# Patient Record
Sex: FEMALE | Race: WHITE | Employment: FULL TIME | URBAN - METROPOLITAN AREA
[De-identification: names, ages, dates, MRNs, and addresses within clinical notes are randomized per-mention and may not be internally consistent; named-entity substitution may affect disease eponyms.]

---

## 2017-01-16 DIAGNOSIS — F98.8 ADD (ATTENTION DEFICIT DISORDER): ICD-10-CM

## 2017-01-16 RX ORDER — DEXTROAMPHETAMINE SACCHARATE, AMPHETAMINE ASPARTATE, DEXTROAMPHETAMINE SULFATE AND AMPHETAMINE SULFATE 5; 5; 5; 5 MG/1; MG/1; MG/1; MG/1
TABLET ORAL
Qty: 60 TAB | Refills: 0 | Status: SHIPPED | OUTPATIENT
Start: 2017-01-16 | End: 2017-03-30 | Stop reason: SDUPTHER

## 2017-01-16 NOTE — TELEPHONE ENCOUNTER
Requested Prescriptions     Pending Prescriptions Disp Refills    dextroamphetamine-amphetamine (ADDERALL) 20 mg tablet 60 Tab 0     Sig: Earliest Fill Date: 1/16/17.  1 tablet each AM.    1 tablet each afternoon      Wants 1 month, insurance won't do 3 month, to  tomorrow.

## 2017-03-09 ENCOUNTER — TELEPHONE (OUTPATIENT)
Dept: INTERNAL MEDICINE CLINIC | Age: 37
End: 2017-03-09

## 2017-03-09 RX ORDER — PREDNISONE 20 MG/1
TABLET ORAL
Qty: 20 TAB | Refills: 0 | Status: SHIPPED | OUTPATIENT
Start: 2017-03-09 | End: 2017-05-24 | Stop reason: ALTCHOICE

## 2017-03-09 NOTE — TELEPHONE ENCOUNTER
Patient has posion ivy and wants to know if Dr Beatriz Kitchen could call something to the pharmacy.  #[phone]

## 2017-03-30 DIAGNOSIS — F98.8 ADD (ATTENTION DEFICIT DISORDER): ICD-10-CM

## 2017-03-30 NOTE — TELEPHONE ENCOUNTER
Requested Prescriptions     Pending Prescriptions Disp Refills    dextroamphetamine-amphetamine (ADDERALL) 20 mg tablet 60 Tab 0     Si tablet each AM.    1 tablet each afternoon        [phone]    Patient would like to pick-up tomorrow about one if possible.

## 2017-03-31 RX ORDER — DEXTROAMPHETAMINE SACCHARATE, AMPHETAMINE ASPARTATE, DEXTROAMPHETAMINE SULFATE AND AMPHETAMINE SULFATE 5; 5; 5; 5 MG/1; MG/1; MG/1; MG/1
TABLET ORAL
Qty: 60 TAB | Refills: 0 | Status: SHIPPED | OUTPATIENT
Start: 2017-03-31 | End: 2017-05-16 | Stop reason: SDUPTHER

## 2017-05-16 DIAGNOSIS — F98.8 ADD (ATTENTION DEFICIT DISORDER): ICD-10-CM

## 2017-05-16 RX ORDER — DEXTROAMPHETAMINE SACCHARATE, AMPHETAMINE ASPARTATE, DEXTROAMPHETAMINE SULFATE AND AMPHETAMINE SULFATE 5; 5; 5; 5 MG/1; MG/1; MG/1; MG/1
TABLET ORAL
Qty: 60 TAB | Refills: 0 | Status: SHIPPED | OUTPATIENT
Start: 2017-05-16 | End: 2017-05-24 | Stop reason: ALTCHOICE

## 2017-05-16 NOTE — TELEPHONE ENCOUNTER
Requested Prescriptions     Pending Prescriptions Disp Refills    dextroamphetamine-amphetamine (ADDERALL) 20 mg tablet 60 Tab 0     Si tablet each AM.    1 tablet each afternoon        [phone]

## 2017-05-24 ENCOUNTER — HOSPITAL ENCOUNTER (OUTPATIENT)
Dept: LAB | Age: 37
Discharge: HOME | End: 2017-05-24
Payer: COMMERCIAL

## 2017-05-24 ENCOUNTER — OFFICE VISIT (OUTPATIENT)
Dept: INTERNAL MEDICINE CLINIC | Age: 37
End: 2017-05-24

## 2017-05-24 VITALS
OXYGEN SATURATION: 98 % | WEIGHT: 132 LBS | TEMPERATURE: 97.8 F | DIASTOLIC BLOOD PRESSURE: 66 MMHG | HEART RATE: 67 BPM | RESPIRATION RATE: 16 BRPM | SYSTOLIC BLOOD PRESSURE: 94 MMHG | BODY MASS INDEX: 21.99 KG/M2 | HEIGHT: 65 IN

## 2017-05-24 DIAGNOSIS — Z00.00 WELL ADULT EXAM: ICD-10-CM

## 2017-05-24 DIAGNOSIS — Z00.00 WELL ADULT EXAM: Primary | ICD-10-CM

## 2017-05-24 DIAGNOSIS — F98.8 ADD (ATTENTION DEFICIT DISORDER) WITHOUT HYPERACTIVITY: ICD-10-CM

## 2017-05-24 LAB
ALBUMIN SERPL BCP-MCNC: 4.2 G/DL (ref 3.4–5)
ALBUMIN/GLOB SERPL: 1.6 {RATIO} (ref 0.8–1.7)
ALP SERPL-CCNC: 50 U/L (ref 45–117)
ALT SERPL-CCNC: 18 U/L (ref 13–56)
ANION GAP BLD CALC-SCNC: 7 MMOL/L (ref 3–18)
AST SERPL W P-5'-P-CCNC: 18 U/L (ref 15–37)
BASOPHILS # BLD AUTO: 0 K/UL (ref 0–0.06)
BASOPHILS # BLD: 0 % (ref 0–2)
BILIRUB SERPL-MCNC: 1.3 MG/DL (ref 0.2–1)
BUN SERPL-MCNC: 12 MG/DL (ref 7–18)
BUN/CREAT SERPL: 17 (ref 12–20)
CALCIUM SERPL-MCNC: 9.4 MG/DL (ref 8.5–10.1)
CHLORIDE SERPL-SCNC: 104 MMOL/L (ref 100–108)
CHOLEST SERPL-MCNC: 164 MG/DL
CO2 SERPL-SCNC: 28 MMOL/L (ref 21–32)
CREAT SERPL-MCNC: 0.69 MG/DL (ref 0.6–1.3)
DIFFERENTIAL METHOD BLD: ABNORMAL
EOSINOPHIL # BLD: 0.1 K/UL (ref 0–0.4)
EOSINOPHIL NFR BLD: 2 % (ref 0–5)
ERYTHROCYTE [DISTWIDTH] IN BLOOD BY AUTOMATED COUNT: 11.9 % (ref 11.6–14.5)
GLOBULIN SER CALC-MCNC: 2.7 G/DL (ref 2–4)
GLUCOSE SERPL-MCNC: 87 MG/DL (ref 74–99)
HCT VFR BLD AUTO: 39.8 % (ref 35–45)
HGB BLD-MCNC: 13.4 G/DL (ref 12–16)
LYMPHOCYTES # BLD AUTO: 27 % (ref 21–52)
LYMPHOCYTES # BLD: 1.7 K/UL (ref 0.9–3.6)
MCH RBC QN AUTO: 34 PG (ref 24–34)
MCHC RBC AUTO-ENTMCNC: 33.7 G/DL (ref 31–37)
MCV RBC AUTO: 101 FL (ref 74–97)
MONOCYTES # BLD: 0.4 K/UL (ref 0.05–1.2)
MONOCYTES NFR BLD AUTO: 6 % (ref 3–10)
NEUTS SEG # BLD: 4.1 K/UL (ref 1.8–8)
NEUTS SEG NFR BLD AUTO: 65 % (ref 40–73)
PLATELET # BLD AUTO: 275 K/UL (ref 135–420)
PMV BLD AUTO: 10.5 FL (ref 9.2–11.8)
POTASSIUM SERPL-SCNC: 4.4 MMOL/L (ref 3.5–5.5)
PROT SERPL-MCNC: 6.9 G/DL (ref 6.4–8.2)
RBC # BLD AUTO: 3.94 M/UL (ref 4.2–5.3)
SODIUM SERPL-SCNC: 139 MMOL/L (ref 136–145)
WBC # BLD AUTO: 6.4 K/UL (ref 4.6–13.2)

## 2017-05-24 PROCEDURE — 82465 ASSAY BLD/SERUM CHOLESTEROL: CPT | Performed by: INTERNAL MEDICINE

## 2017-05-24 PROCEDURE — 85025 COMPLETE CBC W/AUTO DIFF WBC: CPT | Performed by: INTERNAL MEDICINE

## 2017-05-24 PROCEDURE — 36415 COLL VENOUS BLD VENIPUNCTURE: CPT | Performed by: INTERNAL MEDICINE

## 2017-05-24 PROCEDURE — 80053 COMPREHEN METABOLIC PANEL: CPT | Performed by: INTERNAL MEDICINE

## 2017-05-24 RX ORDER — ACYCLOVIR 400 MG/1
400 TABLET ORAL
COMMUNITY
End: 2017-10-17 | Stop reason: SDUPTHER

## 2017-05-24 RX ORDER — DEXTROAMPHETAMINE SACCHARATE, AMPHETAMINE ASPARTATE MONOHYDRATE, DEXTROAMPHETAMINE SULFATE AND AMPHETAMINE SULFATE 5; 5; 5; 5 MG/1; MG/1; MG/1; MG/1
CAPSULE, EXTENDED RELEASE ORAL
Qty: 60 CAP | Refills: 0 | Status: SHIPPED | OUTPATIENT
Start: 2017-05-24 | End: 2017-07-11 | Stop reason: SDUPTHER

## 2017-05-24 NOTE — PROGRESS NOTES
1. Have you been to the ER, urgent care clinic since your last visit? Hospitalized since your last visit? No    2. Have you seen or consulted any other health care providers outside of the [address] [place] [address] since your last visit? Include any pap smears or colon screening.  No

## 2017-05-24 NOTE — PATIENT INSTRUCTIONS
Health Maintenance Due   Topic Date Due    DTaP/Tdap/Td  (1 - Tdap) 07/15/2003    Cervical Cancer Screening  07/15/2003

## 2017-05-25 NOTE — PROGRESS NOTES
The patient presents to the office today for a check up    HPI    The patient presents to the office today for a check up. The patient remains on medications for ADD. The patient is doing well on medications. The patient has no complaints. The patient is in a new job. She is much less stressed. Review of Systems   Respiratory: Negative for shortness of breath. Cardiovascular: Negative for chest pain and leg swelling. Allergies   Allergen Reactions    Buspar [Buspirone] Anaphylaxis and Other (comments)     Dizziness  Lightheadedness       Current Outpatient Prescriptions   Medication Sig Dispense Refill    acyclovir (ZOVIRAX) 400 mg tablet Take 400 mg by mouth every four (4) hours (while awake).  amphetamine-dextroamphetamine XR (ADDERALL XR) 20 mg XR capsule 1 tablet twice per day 60 Cap 0       Past Medical History:   Diagnosis Date    Acute reaction to stress     ADD (attention deficit disorder) without hyperactivity     Depressive disorder     GERD (gastroesophageal reflux disease)     Influenza     Low back pain     Sciatica     Sinusitis     Spasm of muscle     Strain of knee        Past Surgical History:   Procedure Laterality Date    HX TONSILLECTOMY      HX WISDOM TEETH EXTRACTION         Social History     Social History    Marital status:      Spouse name: N/A    Number of children: N/A    Years of education: N/A     Occupational History    Not on file.      Social History Main Topics    Smoking status: Never Smoker    Smokeless tobacco: Not on file    Alcohol use Yes      Comment: socially    Drug use: Not on file    Sexual activity: Yes     Other Topics Concern    Not on file     Social History Narrative       Patient does not have an advanced directive on file    Visit Vitals    BP 94/66 (BP 1 Location: Right arm, BP Patient Position: Sitting)    Pulse 67    Temp 97.8 °F (36.6 °C) (Tympanic)    Resp 16    Ht 5' 5\" (1.651 m)    Wt 132 lb (59.9 kg)    SpO2 98%    BMI 21.97 kg/m2       Physical Exam   No Cervical Lymphadenopathy  No Supraclavicular Lymphadenopathy  Thyroid is Normal  Lungs are clear to ausculation and percussion  Heart:  S1 S2 are normal, No gallops, No mummers  No Carotid Bruits  Abdomen:  Normal Bowel Sounds. No tenderness. No masses. No Hepatomegaly or Splenomegly  LE:  Strong Pedal Pulses. No Edema      BMI:  [facility] Outpatient Visit on 05/24/2017   Component Date Value Ref Range Status    WBC 05/24/2017 6.4  4.6 - 13.2 K/uL Final    RBC 05/24/2017 3.94* 4.20 - 5.30 M/uL Final    HGB 05/24/2017 13.4  12.0 - 16.0 g/dL Final    HCT 05/24/2017 39.8  35.0 - 45.0 % Final    MCV 05/24/2017 101.0* 74.0 - 97.0 FL Final    MCH 05/24/2017 34.0  24.0 - 34.0 PG Final    MCHC 05/24/2017 33.7  31.0 - 37.0 g/dL Final    RDW 05/24/2017 11.9  11.6 - 14.5 % Final    PLATELET 60/27/3865 956  135 - 420 K/uL Final    MPV 05/24/2017 10.5  9.2 - 11.8 FL Final    NEUTROPHILS 05/24/2017 65  40 - 73 % Final    LYMPHOCYTES 05/24/2017 27  21 - 52 % Final    MONOCYTES 05/24/2017 6  3 - 10 % Final    EOSINOPHILS 05/24/2017 2  0 - 5 % Final    BASOPHILS 05/24/2017 0  0 - 2 % Final    ABS. NEUTROPHILS 05/24/2017 4.1  1.8 - 8.0 K/UL Final    ABS. LYMPHOCYTES 05/24/2017 1.7  0.9 - 3.6 K/UL Final    ABS. MONOCYTES 05/24/2017 0.4  0.05 - 1.2 K/UL Final    ABS. EOSINOPHILS 05/24/2017 0.1  0.0 - 0.4 K/UL Final    ABS.  BASOPHILS 05/24/2017 0.0  0.0 - 0.06 K/UL Final    DF 05/24/2017 AUTOMATED    Final    Cholesterol, total 05/24/2017 164  <200 MG/DL Final    Sodium 05/24/2017 139  136 - 145 mmol/L Final    Potassium 05/24/2017 4.4  3.5 - 5.5 mmol/L Final    Chloride 05/24/2017 104  100 - 108 mmol/L Final    CO2 05/24/2017 28  21 - 32 mmol/L Final    Anion gap 05/24/2017 7  3.0 - 18 mmol/L Final    Glucose 05/24/2017 87  74 - 99 mg/dL Final    BUN 05/24/2017 12  7.0 - 18 MG/DL Final    Creatinine 05/24/2017 0.69  0.6 - 1.3 MG/DL Final  BUN/Creatinine ratio 05/24/2017 17  12 - 20   Final    GFR est AA 05/24/2017 >60  >60 ml/min/1.73m2 Final    GFR est non-AA 05/24/2017 >60  >60 ml/min/1.73m2 Final    Comment: (NOTE)  Estimated GFR is calculated using the Modification of Diet in Renal   Disease (MDRD) Study equation, reported for both  Americans   (GFRAA) and non- Americans (GFRNA), and normalized to 1.73m2   body surface area. The physician must decide which value applies to   the patient. The MDRD study equation should only be used in   individuals age 25 or older. It has not been validated for the   following: pregnant women, patients with serious comorbid conditions,   or on certain medications, or persons with extremes of body size,   muscle mass, or nutritional status.  Calcium 05/24/2017 9.4  8.5 - 10.1 MG/DL Final    Bilirubin, total 05/24/2017 1.3* 0.2 - 1.0 MG/DL Final    ALT (SGPT) 05/24/2017 18  13 - 56 U/L Final    AST (SGOT) 05/24/2017 18  15 - 37 U/L Final    Alk. phosphatase 05/24/2017 50  45 - 117 U/L Final    Protein, total 05/24/2017 6.9  6.4 - 8.2 g/dL Final    Albumin 05/24/2017 4.2  3.4 - 5.0 g/dL Final    Globulin 05/24/2017 2.7  2.0 - 4.0 g/dL Final    A-G Ratio 05/24/2017 1.6  0.8 - 1.7   Final       .  Results for orders placed or performed during the hospital encounter of 05/24/17   CBC WITH AUTOMATED DIFF   Result Value Ref Range    WBC 6.4 4.6 - 13.2 K/uL    RBC 3.94 (L) 4.20 - 5.30 M/uL    HGB 13.4 12.0 - 16.0 g/dL    HCT 39.8 35.0 - 45.0 %    .0 (H) 74.0 - 97.0 FL    MCH 34.0 24.0 - 34.0 PG    MCHC 33.7 31.0 - 37.0 g/dL    RDW 11.9 11.6 - 14.5 %    PLATELET 503 989 - 786 K/uL    MPV 10.5 9.2 - 11.8 FL    NEUTROPHILS 65 40 - 73 %    LYMPHOCYTES 27 21 - 52 %    MONOCYTES 6 3 - 10 %    EOSINOPHILS 2 0 - 5 %    BASOPHILS 0 0 - 2 %    ABS. NEUTROPHILS 4.1 1.8 - 8.0 K/UL    ABS. LYMPHOCYTES 1.7 0.9 - 3.6 K/UL    ABS. MONOCYTES 0.4 0.05 - 1.2 K/UL    ABS.  EOSINOPHILS 0.1 0.0 - 0.4 K/UL ABS. BASOPHILS 0.0 0.0 - 0.06 K/UL    DF AUTOMATED     CHOLESTEROL, TOTAL   Result Value Ref Range    Cholesterol, total 164 <872 MG/DL   METABOLIC PANEL, COMPREHENSIVE   Result Value Ref Range    Sodium 139 136 - 145 mmol/L    Potassium 4.4 3.5 - 5.5 mmol/L    Chloride 104 100 - 108 mmol/L    CO2 28 21 - 32 mmol/L    Anion gap 7 3.0 - 18 mmol/L    Glucose 87 74 - 99 mg/dL    BUN 12 7.0 - 18 MG/DL    Creatinine 0.69 0.6 - 1.3 MG/DL    BUN/Creatinine ratio 17 12 - 20      GFR est AA >60 >60 ml/min/1.73m2    GFR est non-AA >60 >60 ml/min/1.73m2    Calcium 9.4 8.5 - 10.1 MG/DL    Bilirubin, total 1.3 (H) 0.2 - 1.0 MG/DL    ALT (SGPT) 18 13 - 56 U/L    AST (SGOT) 18 15 - 37 U/L    Alk. phosphatase 50 45 - 117 U/L    Protein, total 6.9 6.4 - 8.2 g/dL    Albumin 4.2 3.4 - 5.0 g/dL    Globulin 2.7 2.0 - 4.0 g/dL    A-G Ratio 1.6 0.8 - 1.7         Assessment / Plan      ICD-10-CM ICD-9-CM    1. Well adult exam Z00.00 V70.0 acyclovir (ZOVIRAX) 400 mg tablet      CBC WITH AUTOMATED DIFF      CHOLESTEROL, TOTAL      METABOLIC PANEL, COMPREHENSIVE   2. ADD (attention deficit disorder) without hyperactivity F98.8 314.00        I advised the patient to continue good health habits  Labs ordered  she was advised to continue her maintenance medications    Follow-up Disposition:  Return in about 6 months (around 11/24/2017). I asked Zaki Roman if she has any questions and I answered the questions.   Zaki Roman states that she understands the treatment plan and agrees with the treatment plan

## 2017-05-30 ENCOUNTER — TELEPHONE (OUTPATIENT)
Dept: INTERNAL MEDICINE CLINIC | Age: 37
End: 2017-05-30

## 2017-05-30 NOTE — TELEPHONE ENCOUNTER
Prior Auth received for D-amphetamine salt ER, Insurance preferred in brand only Adderall XR. Pharmacy contacted and patient contacted.

## 2017-07-11 NOTE — TELEPHONE ENCOUNTER
Requested Prescriptions     Pending Prescriptions Disp Refills    amphetamine-dextroamphetamine XR (ADDERALL XR) 20 mg XR capsule 60 Cap 0     Si tablet twice per day        [phone]

## 2017-07-14 RX ORDER — DEXTROAMPHETAMINE SACCHARATE, AMPHETAMINE ASPARTATE MONOHYDRATE, DEXTROAMPHETAMINE SULFATE AND AMPHETAMINE SULFATE 5; 5; 5; 5 MG/1; MG/1; MG/1; MG/1
CAPSULE, EXTENDED RELEASE ORAL
Qty: 60 CAP | Refills: 0 | Status: SHIPPED | OUTPATIENT
Start: 2017-07-14 | End: 2017-08-22 | Stop reason: SDUPTHER

## 2017-07-19 ENCOUNTER — TELEPHONE (OUTPATIENT)
Dept: INTERNAL MEDICINE CLINIC | Age: 37
End: 2017-07-19

## 2017-08-22 ENCOUNTER — HOSPITAL ENCOUNTER (OUTPATIENT)
Dept: LAB | Age: 37
Discharge: HOME | End: 2017-08-22
Payer: COMMERCIAL

## 2017-08-22 ENCOUNTER — OFFICE VISIT (OUTPATIENT)
Dept: INTERNAL MEDICINE CLINIC | Age: 37
End: 2017-08-22

## 2017-08-22 VITALS
BODY MASS INDEX: 21.99 KG/M2 | HEART RATE: 72 BPM | HEIGHT: 65 IN | OXYGEN SATURATION: 98 % | SYSTOLIC BLOOD PRESSURE: 102 MMHG | WEIGHT: 132 LBS | DIASTOLIC BLOOD PRESSURE: 66 MMHG | RESPIRATION RATE: 16 BRPM | TEMPERATURE: 97.7 F

## 2017-08-22 DIAGNOSIS — R59.1 LYMPHADENOPATHY OF HEAD AND NECK: ICD-10-CM

## 2017-08-22 DIAGNOSIS — R61 NIGHT SWEATS: ICD-10-CM

## 2017-08-22 DIAGNOSIS — M54.50 ACUTE RIGHT-SIDED LOW BACK PAIN WITHOUT SCIATICA: Primary | ICD-10-CM

## 2017-08-22 DIAGNOSIS — M54.50 ACUTE RIGHT-SIDED LOW BACK PAIN WITHOUT SCIATICA: ICD-10-CM

## 2017-08-22 LAB
BASOPHILS # BLD: 0 K/UL (ref 0–0.06)
BASOPHILS NFR BLD: 1 % (ref 0–2)
CRP SERPL-MCNC: <0.3 MG/DL (ref 0–0.3)
DIFFERENTIAL METHOD BLD: ABNORMAL
EOSINOPHIL # BLD: 0.1 K/UL (ref 0–0.4)
EOSINOPHIL NFR BLD: 1 % (ref 0–5)
ERYTHROCYTE [DISTWIDTH] IN BLOOD BY AUTOMATED COUNT: 12 % (ref 11.6–14.5)
ERYTHROCYTE [SEDIMENTATION RATE] IN BLOOD: 4 MM/HR (ref 0–20)
HCT VFR BLD AUTO: 41 % (ref 35–45)
HGB BLD-MCNC: 13.7 G/DL (ref 12–16)
LYMPHOCYTES # BLD: 1.7 K/UL (ref 0.9–3.6)
LYMPHOCYTES NFR BLD: 31 % (ref 21–52)
MCH RBC QN AUTO: 33.7 PG (ref 24–34)
MCHC RBC AUTO-ENTMCNC: 33.4 G/DL (ref 31–37)
MCV RBC AUTO: 101 FL (ref 74–97)
MONOCYTES # BLD: 0.4 K/UL (ref 0.05–1.2)
MONOCYTES NFR BLD: 8 % (ref 3–10)
NEUTS SEG # BLD: 3.2 K/UL (ref 1.8–8)
NEUTS SEG NFR BLD: 59 % (ref 40–73)
PLATELET # BLD AUTO: 278 K/UL (ref 135–420)
PMV BLD AUTO: 10.7 FL (ref 9.2–11.8)
RBC # BLD AUTO: 4.06 M/UL (ref 4.2–5.3)
TSH SERPL DL<=0.05 MIU/L-ACNC: 0.87 UIU/ML (ref 0.36–3.74)
WBC # BLD AUTO: 5.5 K/UL (ref 4.6–13.2)

## 2017-08-22 PROCEDURE — 36415 COLL VENOUS BLD VENIPUNCTURE: CPT | Performed by: INTERNAL MEDICINE

## 2017-08-22 PROCEDURE — 84443 ASSAY THYROID STIM HORMONE: CPT | Performed by: INTERNAL MEDICINE

## 2017-08-22 PROCEDURE — 85025 COMPLETE CBC W/AUTO DIFF WBC: CPT | Performed by: INTERNAL MEDICINE

## 2017-08-22 PROCEDURE — 83921 ORGANIC ACID SINGLE QUANT: CPT | Performed by: INTERNAL MEDICINE

## 2017-08-22 PROCEDURE — 85652 RBC SED RATE AUTOMATED: CPT | Performed by: INTERNAL MEDICINE

## 2017-08-22 PROCEDURE — 86140 C-REACTIVE PROTEIN: CPT | Performed by: INTERNAL MEDICINE

## 2017-08-22 RX ORDER — DEXTROAMPHETAMINE SACCHARATE, AMPHETAMINE ASPARTATE MONOHYDRATE, DEXTROAMPHETAMINE SULFATE AND AMPHETAMINE SULFATE 5; 5; 5; 5 MG/1; MG/1; MG/1; MG/1
CAPSULE, EXTENDED RELEASE ORAL
Qty: 60 CAP | Refills: 0 | Status: SHIPPED | OUTPATIENT
Start: 2017-08-22 | End: 2017-10-03 | Stop reason: SDUPTHER

## 2017-08-22 NOTE — PROGRESS NOTES
The patient presents to the office today with the chief complaint of fatigue    HPI    The patient complains of fatigue, night sweats. She intermittently feels swollen LN in her neck. The patient has right lower back pain nd some pain in her right lower abdomen      Review of Systems   Constitutional: Positive for malaise/fatigue. Respiratory: Negative for shortness of breath. Cardiovascular: Negative for chest pain and leg swelling. Gastrointestinal: Positive for abdominal pain. Musculoskeletal: Positive for back pain. Psychiatric/Behavioral: The patient has insomnia. Allergies   Allergen Reactions    Buspar [Buspirone] Anaphylaxis and Other (comments)     Dizziness  Lightheadedness       Current Outpatient Prescriptions   Medication Sig Dispense Refill    amphetamine-dextroamphetamine XR (ADDERALL XR) 20 mg XR capsule Earliest Fill Date: 7/14/17.  1 tablet twice per day 60 Cap 0    acyclovir (ZOVIRAX) 400 mg tablet Take 400 mg by mouth every four (4) hours (while awake). Past Medical History:   Diagnosis Date    Acute reaction to stress     ADD (attention deficit disorder) without hyperactivity     Depressive disorder     GERD (gastroesophageal reflux disease)     Influenza     Low back pain     Sciatica     Sinusitis     Spasm of muscle     Strain of knee        Past Surgical History:   Procedure Laterality Date    HX TONSILLECTOMY      HX WISDOM TEETH EXTRACTION         Social History     Social History    Marital status:      Spouse name: N/A    Number of children: N/A    Years of education: N/A     Occupational History    Not on file.      Social History Main Topics    Smoking status: Never Smoker    Smokeless tobacco: Never Used    Alcohol use Yes      Comment: socially    Drug use: No    Sexual activity: Yes     Other Topics Concern    Not on file     Social History Narrative       Patient does not have an advanced directive on file    Visit Vitals  /66 (BP 1 Location: Left arm, BP Patient Position: Sitting)    Pulse 72    Temp 97.7 °F (36.5 °C) (Tympanic)    Resp 16    Ht 5' 5\" (1.651 m)    Wt 132 lb (59.9 kg)    SpO2 98%    BMI 21.97 kg/m2       Physical Exam   Neck: Carotid bruit is not present. No thyromegaly present. Cardiovascular: Normal rate and regular rhythm. Exam reveals no gallop. No murmur heard. Pulmonary/Chest: She has no wheezes. She has no rales. Abdominal: Soft. Bowel sounds are normal. She exhibits no distension and no mass. There is splenomegaly (Spleen tip barely palpable right uppr abdomen) and hepatomegaly (Liver 1 cm below right costal margin). There is no tenderness. Musculoskeletal: She exhibits no edema. Lymphadenopathy:     She has cervical adenopathy (Few shotty LN bilaterally). BMI:  [facility] Outpatient Visit on 05/24/2017   Component Date Value Ref Range Status    WBC 05/24/2017 6.4  4.6 - 13.2 K/uL Final    RBC 05/24/2017 3.94* 4.20 - 5.30 M/uL Final    HGB 05/24/2017 13.4  12.0 - 16.0 g/dL Final    HCT 05/24/2017 39.8  35.0 - 45.0 % Final    MCV 05/24/2017 101.0* 74.0 - 97.0 FL Final    MCH 05/24/2017 34.0  24.0 - 34.0 PG Final    MCHC 05/24/2017 33.7  31.0 - 37.0 g/dL Final    RDW 05/24/2017 11.9  11.6 - 14.5 % Final    PLATELET 02/93/6428 204  135 - 420 K/uL Final    MPV 05/24/2017 10.5  9.2 - 11.8 FL Final    NEUTROPHILS 05/24/2017 65  40 - 73 % Final    LYMPHOCYTES 05/24/2017 27  21 - 52 % Final    MONOCYTES 05/24/2017 6  3 - 10 % Final    EOSINOPHILS 05/24/2017 2  0 - 5 % Final    BASOPHILS 05/24/2017 0  0 - 2 % Final    ABS. NEUTROPHILS 05/24/2017 4.1  1.8 - 8.0 K/UL Final    ABS. LYMPHOCYTES 05/24/2017 1.7  0.9 - 3.6 K/UL Final    ABS. MONOCYTES 05/24/2017 0.4  0.05 - 1.2 K/UL Final    ABS. EOSINOPHILS 05/24/2017 0.1  0.0 - 0.4 K/UL Final    ABS.  BASOPHILS 05/24/2017 0.0  0.0 - 0.06 K/UL Final    DF 05/24/2017 AUTOMATED    Final    Cholesterol, total 05/24/2017 164  <200 MG/DL Final    Sodium 05/24/2017 139  136 - 145 mmol/L Final    Potassium 05/24/2017 4.4  3.5 - 5.5 mmol/L Final    Chloride 05/24/2017 104  100 - 108 mmol/L Final    CO2 05/24/2017 28  21 - 32 mmol/L Final    Anion gap 05/24/2017 7  3.0 - 18 mmol/L Final    Glucose 05/24/2017 87  74 - 99 mg/dL Final    BUN 05/24/2017 12  7.0 - 18 MG/DL Final    Creatinine 05/24/2017 0.69  0.6 - 1.3 MG/DL Final    BUN/Creatinine ratio 05/24/2017 17  12 - 20   Final    GFR est AA 05/24/2017 >60  >60 ml/min/1.73m2 Final    GFR est non-AA 05/24/2017 >60  >60 ml/min/1.73m2 Final    Comment: (NOTE)  Estimated GFR is calculated using the Modification of Diet in Renal   Disease (MDRD) Study equation, reported for both  Americans   (GFRAA) and non- Americans (GFRNA), and normalized to 1.73m2   body surface area. The physician must decide which value applies to   the patient. The MDRD study equation should only be used in   individuals age 25 or older. It has not been validated for the   following: pregnant women, patients with serious comorbid conditions,   or on certain medications, or persons with extremes of body size,   muscle mass, or nutritional status.  Calcium 05/24/2017 9.4  8.5 - 10.1 MG/DL Final    Bilirubin, total 05/24/2017 1.3* 0.2 - 1.0 MG/DL Final    ALT (SGPT) 05/24/2017 18  13 - 56 U/L Final    AST (SGOT) 05/24/2017 18  15 - 37 U/L Final    Alk. phosphatase 05/24/2017 50  45 - 117 U/L Final    Protein, total 05/24/2017 6.9  6.4 - 8.2 g/dL Final    Albumin 05/24/2017 4.2  3.4 - 5.0 g/dL Final    Globulin 05/24/2017 2.7  2.0 - 4.0 g/dL Final    A-G Ratio 05/24/2017 1.6  0.8 - 1.7   Final       .No results found for any visits on 08/22/17. Assessment / Plan      ICD-10-CM ICD-9-CM    1.  Acute right-sided low back pain without sciatica M54.5 724.2 CBC WITH AUTOMATED DIFF      TSH 3RD GENERATION      METHYLMALONIC ACID      C REACTIVE PROTEIN, QT      SED RATE (ESR) US ABD LTD      URINALYSIS W/MICROSCOPIC      XR CHEST PA LAT   2. Lymphadenopathy of head and neck R59.1 785.6 CBC WITH AUTOMATED DIFF      TSH 3RD GENERATION      METHYLMALONIC ACID      C REACTIVE PROTEIN, QT      SED RATE (ESR)      US ABD LTD      URINALYSIS W/MICROSCOPIC      XR CHEST PA LAT   3. Night sweats R61 780.8 CBC WITH AUTOMATED DIFF      TSH 3RD GENERATION      METHYLMALONIC ACID      C REACTIVE PROTEIN, QT      SED RATE (ESR)      US ABD LTD      URINALYSIS W/MICROSCOPIC      XR CHEST PA LAT       Follow-up Disposition:  Return in about 7 months (around 3/22/2018). I asked Patricia Chacon if she has any questions and I answered the questions.   Patricia Chacon states that she understands the treatment plan and agrees with the treatment plan

## 2017-08-22 NOTE — TELEPHONE ENCOUNTER
From: Rafaela Obrien  To: Madisyn West MD  Sent: 8/22/2017 12:19 PM EDT  Subject: Medication Renewal Request    Original authorizing provider: MD Rafaela Powers would like a refill of the following medications:  amphetamine-dextroamphetamine XR (ADDERALL XR) 20 mg XR capsule Madisyn West MD]    Preferred pharmacy: [place] [id number] [address]    Comment:  please call [phone] when prescription is ready for .  Thank you

## 2017-08-22 NOTE — PROGRESS NOTES
1. Have you been to the ER, urgent care clinic since your last visit? Hospitalized since your last visit? No    2. Have you seen or consulted any other health care providers outside of the [address] [place] [address] since your last visit? Include any pap smears or colon screening.  No

## 2017-08-22 NOTE — PATIENT INSTRUCTIONS
Health Maintenance Due   Topic    DTaP/Tdap/Td series (1 - Tdap)    PAP AKA CERVICAL CYTOLOGY     INFLUENZA AGE 9 TO ADULT

## 2017-08-26 LAB — METHYLMALONATE SERPL-SCNC: 158 NMOL/L (ref 0–378)

## 2017-09-21 ENCOUNTER — OFFICE VISIT (OUTPATIENT)
Dept: INTERNAL MEDICINE CLINIC | Age: 37
End: 2017-09-21

## 2017-09-21 VITALS
HEART RATE: 84 BPM | RESPIRATION RATE: 16 BRPM | SYSTOLIC BLOOD PRESSURE: 110 MMHG | WEIGHT: 132 LBS | HEIGHT: 65 IN | OXYGEN SATURATION: 99 % | BODY MASS INDEX: 21.99 KG/M2 | DIASTOLIC BLOOD PRESSURE: 68 MMHG | TEMPERATURE: 98.1 F

## 2017-09-21 DIAGNOSIS — M79.605 PAIN OF LEFT LOWER EXTREMITY: Primary | ICD-10-CM

## 2017-09-21 RX ORDER — NAPROXEN 500 MG/1
500 TABLET ORAL 2 TIMES DAILY WITH MEALS
Qty: 20 TAB | Refills: 0 | Status: SHIPPED | OUTPATIENT
Start: 2017-09-21 | End: 2019-02-08 | Stop reason: ALTCHOICE

## 2017-09-24 NOTE — PROGRESS NOTES
Jamie Diaz is a 30 y.o. female presenting today for Knee Pain (x 2 days)  . HPI:  Jamie Diaz presents to the office today for sudden onset of posterior knee pain with antalgic gait. patient denied any known injury. Review of Systems   Cardiovascular: Negative for chest pain and palpitations. Musculoskeletal: Positive for joint pain (left knee). Allergies   Allergen Reactions    Buspar [Buspirone] Anaphylaxis and Other (comments)     Dizziness  Lightheadedness       Current Outpatient Prescriptions   Medication Sig Dispense Refill    naproxen (NAPROSYN) 500 mg tablet Take 1 Tab by mouth two (2) times daily (with meals). 20 Tab 0    amphetamine-dextroamphetamine XR (ADDERALL XR) 20 mg XR capsule Earliest Fill Date: 8/22/17.  1 tablet twice per day 60 Cap 0    acyclovir (ZOVIRAX) 400 mg tablet Take 400 mg by mouth every four (4) hours (while awake). Past Medical History:   Diagnosis Date    Acute reaction to stress     ADD (attention deficit disorder) without hyperactivity     Depressive disorder     GERD (gastroesophageal reflux disease)     Influenza     Low back pain     Sciatica     Sinusitis     Spasm of muscle     Strain of knee        Past Surgical History:   Procedure Laterality Date    HX TONSILLECTOMY      HX WISDOM TEETH EXTRACTION         Social History     Social History    Marital status:      Spouse name: N/A    Number of children: N/A    Years of education: N/A     Occupational History    Not on file.      Social History Main Topics    Smoking status: Never Smoker    Smokeless tobacco: Never Used    Alcohol use Yes      Comment: socially    Drug use: No    Sexual activity: Yes     Other Topics Concern    Not on file     Social History Narrative       Patient does not have an advanced directive on file    Vitals:    09/21/17 1313   BP: 110/68   Pulse: 84   Resp: 16   Temp: 98.1 °F (36.7 °C)   TempSrc: Tympanic   SpO2: 99%   Weight: 132 lb (59.9 kg)   Height: 5' 5\" (1.651 m)   PainSc:   5   PainLoc: Knee       Physical Exam   Cardiovascular: Normal rate, regular rhythm and normal heart sounds. Pulmonary/Chest: Effort normal.   Nursing note and vitals reviewed. Hospital Outpatient Visit on 08/22/2017   Component Date Value Ref Range Status    WBC 08/22/2017 5.5  4.6 - 13.2 K/uL Final    RBC 08/22/2017 4.06* 4.20 - 5.30 M/uL Final    HGB 08/22/2017 13.7  12.0 - 16.0 g/dL Final    HCT 08/22/2017 41.0  35.0 - 45.0 % Final    MCV 08/22/2017 101.0* 74.0 - 97.0 FL Final    MCH 08/22/2017 33.7  24.0 - 34.0 PG Final    MCHC 08/22/2017 33.4  31.0 - 37.0 g/dL Final    RDW 08/22/2017 12.0  11.6 - 14.5 % Final    PLATELET 75/80/5520 910  135 - 420 K/uL Final    MPV 08/22/2017 10.7  9.2 - 11.8 FL Final    NEUTROPHILS 08/22/2017 59  40 - 73 % Final    LYMPHOCYTES 08/22/2017 31  21 - 52 % Final    MONOCYTES 08/22/2017 8  3 - 10 % Final    EOSINOPHILS 08/22/2017 1  0 - 5 % Final    BASOPHILS 08/22/2017 1  0 - 2 % Final    ABS. NEUTROPHILS 08/22/2017 3.2  1.8 - 8.0 K/UL Final    ABS. LYMPHOCYTES 08/22/2017 1.7  0.9 - 3.6 K/UL Final    ABS. MONOCYTES 08/22/2017 0.4  0.05 - 1.2 K/UL Final    ABS. EOSINOPHILS 08/22/2017 0.1  0.0 - 0.4 K/UL Final    ABS. BASOPHILS 08/22/2017 0.0  0.0 - 0.06 K/UL Final    DF 08/22/2017 AUTOMATED    Final    TSH 08/22/2017 0.87  0.36 - 3.74 uIU/mL Final    Methylmalonic acid 08/22/2017 158  0 - 378 nmol/L Final    Comment: (NOTE)  Performed At: [address] [id number]  Emir Yoo MD AQ:[id number]      C-Reactive protein 08/22/2017 <0.3  0 - 0.3 mg/dL Final    Sed rate, automated 08/22/2017 4  0 - 20 mm/hr Final       .No results found for any visits on 09/21/17. Assessment / Plan:      ICD-10-CM ICD-9-CM    1.  Pain of left lower extremity M79.605 729.5 naproxen (NAPROSYN) 500 mg tablet      DUPLEX LOWER EXT VENOUS LEFT     Duplex LLE  Naproxyn rx  F/u prn    Follow-up Disposition:  Return in about 12 days (around 10/3/2017). I asked the patient if she  had any questions and answered her  questions.   The patient stated that she understands the treatment plan and agrees with the treatment plan

## 2017-10-04 RX ORDER — DEXTROAMPHETAMINE SACCHARATE, AMPHETAMINE ASPARTATE MONOHYDRATE, DEXTROAMPHETAMINE SULFATE AND AMPHETAMINE SULFATE 5; 5; 5; 5 MG/1; MG/1; MG/1; MG/1
CAPSULE, EXTENDED RELEASE ORAL
Qty: 60 CAP | Refills: 0 | Status: SHIPPED | OUTPATIENT
Start: 2017-10-04 | End: 2017-11-13 | Stop reason: SDUPTHER

## 2017-10-04 NOTE — TELEPHONE ENCOUNTER
From: Laurence Dang  To: Ivonne Leong MD  Sent: 10/3/2017 11:26 AM EDT  Subject: Medication Renewal Request    Original authorizing provider: MD Laurence Ariza would like a refill of the following medications:  amphetamine-dextroamphetamine XR (ADDERALL XR) 20 mg XR capsule Ivonne Leong MD]    Preferred pharmacy: [address] [place] [address] AT [address] [facility]    Comment:

## 2017-10-17 DIAGNOSIS — Z00.00 WELL ADULT EXAM: ICD-10-CM

## 2017-10-18 RX ORDER — ACYCLOVIR 400 MG/1
400 TABLET ORAL
Qty: 60 TAB | Refills: 0 | Status: SHIPPED | OUTPATIENT
Start: 2017-10-18 | End: 2018-07-24 | Stop reason: SDUPTHER

## 2017-11-13 RX ORDER — DEXTROAMPHETAMINE SACCHARATE, AMPHETAMINE ASPARTATE MONOHYDRATE, DEXTROAMPHETAMINE SULFATE AND AMPHETAMINE SULFATE 5; 5; 5; 5 MG/1; MG/1; MG/1; MG/1
CAPSULE, EXTENDED RELEASE ORAL
Qty: 60 CAP | Refills: 0 | Status: SHIPPED | OUTPATIENT
Start: 2017-11-13 | End: 2017-12-15 | Stop reason: SDUPTHER

## 2017-11-13 NOTE — TELEPHONE ENCOUNTER
From: Shelby Napier  To: Savi Workman MD  Sent: 11/13/2017 2:32 PM EST  Subject: Medication Renewal Request    Original authorizing provider: MD Shelby Skaggs would like a refill of the following medications:  amphetamine-dextroamphetamine XR (ADDERALL XR) 20 mg XR capsule Savi Workman MD]    Preferred pharmacy: Other - [place]  script from your office    Comment:  Please call [phone] when script is ready to be picked up from your office

## 2017-12-15 RX ORDER — DEXTROAMPHETAMINE SACCHARATE, AMPHETAMINE ASPARTATE MONOHYDRATE, DEXTROAMPHETAMINE SULFATE AND AMPHETAMINE SULFATE 5; 5; 5; 5 MG/1; MG/1; MG/1; MG/1
CAPSULE, EXTENDED RELEASE ORAL
Qty: 60 CAP | Refills: 0 | Status: SHIPPED | OUTPATIENT
Start: 2017-12-15 | End: 2018-02-05 | Stop reason: SDUPTHER

## 2017-12-15 NOTE — TELEPHONE ENCOUNTER
From: Ema Fonseca  To: Alcides Lombardo MD  Sent: 12/15/2017 2:45 PM EST  Subject: Medication Renewal Request    Original authorizing provider: MD Ema Benítez would like a refill of the following medications:  amphetamine-dextroamphetamine XR (ADDERALL XR) 20 mg XR capsule Alcides Lombardo MD]    Preferred pharmacy: [address], [address] AT [address] [facility]    Comment:  Please call [phone] when ready to

## 2018-02-05 DIAGNOSIS — F98.8 ADD (ATTENTION DEFICIT DISORDER) WITHOUT HYPERACTIVITY: Primary | ICD-10-CM

## 2018-02-05 RX ORDER — DEXTROAMPHETAMINE SACCHARATE, AMPHETAMINE ASPARTATE MONOHYDRATE, DEXTROAMPHETAMINE SULFATE AND AMPHETAMINE SULFATE 5; 5; 5; 5 MG/1; MG/1; MG/1; MG/1
CAPSULE, EXTENDED RELEASE ORAL
Qty: 60 CAP | Refills: 0 | Status: SHIPPED | OUTPATIENT
Start: 2018-02-05 | End: 2018-03-21 | Stop reason: SDUPTHER

## 2018-02-05 NOTE — TELEPHONE ENCOUNTER
From: Say Sim  To: Socorro Brito MD  Sent: 2/5/2018 4:14 PM EST  Subject: Medication Renewal Request    Original authorizing provider: MD Say Mathis would like a refill of the following medications:  amphetamine-dextroamphetamine XR (ADDERALL XR) 20 mg XR capsule Socorro Brito MD]    Preferred pharmacy: [place] [id number] [place] [address] [address]    Comment:  My insurance company is no longer covering Adderall XR. Prescription has to be generic. Please call [phone] when prescription is ready for . Thank you.

## 2018-03-20 DIAGNOSIS — F98.8 ADD (ATTENTION DEFICIT DISORDER) WITHOUT HYPERACTIVITY: ICD-10-CM

## 2018-03-21 RX ORDER — DEXTROAMPHETAMINE SACCHARATE, AMPHETAMINE ASPARTATE MONOHYDRATE, DEXTROAMPHETAMINE SULFATE AND AMPHETAMINE SULFATE 5; 5; 5; 5 MG/1; MG/1; MG/1; MG/1
CAPSULE, EXTENDED RELEASE ORAL
Qty: 60 CAP | Refills: 0 | Status: SHIPPED | OUTPATIENT
Start: 2018-03-21 | End: 2018-04-27 | Stop reason: SDUPTHER

## 2018-03-21 RX ORDER — DEXTROAMPHETAMINE SACCHARATE, AMPHETAMINE ASPARTATE MONOHYDRATE, DEXTROAMPHETAMINE SULFATE AND AMPHETAMINE SULFATE 5; 5; 5; 5 MG/1; MG/1; MG/1; MG/1
CAPSULE, EXTENDED RELEASE ORAL
Qty: 60 CAP | Refills: 0 | OUTPATIENT
Start: 2018-03-21

## 2018-03-21 NOTE — TELEPHONE ENCOUNTER
From: John Son  To: Jelani Caldwell MD  Sent: 3/20/2018 9:17 AM EDT  Subject: Medication Renewal Request    Original authorizing provider: Jelani Caldwell MD    John Son would like a refill of the following medications:  amphetamine-dextroamphetamine XR (ADDERALL XR) 20 mg XR capsule Jelani Caldwell MD]    Preferred pharmacy: [address], [address] AT [address]    Comment:  Please write prescription for generic brand because insurance no longer covers Adderall xr. Please call my cell, [phone] when ready to .

## 2018-04-27 DIAGNOSIS — F98.8 ADD (ATTENTION DEFICIT DISORDER) WITHOUT HYPERACTIVITY: ICD-10-CM

## 2018-04-27 RX ORDER — DEXTROAMPHETAMINE SACCHARATE, AMPHETAMINE ASPARTATE MONOHYDRATE, DEXTROAMPHETAMINE SULFATE AND AMPHETAMINE SULFATE 5; 5; 5; 5 MG/1; MG/1; MG/1; MG/1
CAPSULE, EXTENDED RELEASE ORAL
Qty: 60 CAP | Refills: 0 | Status: SHIPPED | OUTPATIENT
Start: 2018-04-27 | End: 2018-05-25 | Stop reason: SDUPTHER

## 2018-05-25 DIAGNOSIS — F98.8 ADD (ATTENTION DEFICIT DISORDER) WITHOUT HYPERACTIVITY: ICD-10-CM

## 2018-05-25 NOTE — TELEPHONE ENCOUNTER
From: Roxana Delacruz  To: Gloria Mckenna MD  Sent: 5/25/2018 9:24 AM EDT  Subject: Medication Renewal Request    Original authorizing provider: MD Tracey Fischere Nubia would like a refill of the following medications:  amphetamine-dextroamphetamine XR (ADDERALL XR) 20 mg XR capsule Gloria Mckenna MD]    Preferred pharmacy: Other - [place]  from office    Comment: This prescription is available for refill on 5/27/18. I am submitting the request early due to the holiday weekend and the 48 hour turn around time for refills. I will  prescription from your office on Tuesday, May 29th. Thanks and have a great holiday weekend.

## 2018-05-27 RX ORDER — DEXTROAMPHETAMINE SACCHARATE, AMPHETAMINE ASPARTATE MONOHYDRATE, DEXTROAMPHETAMINE SULFATE AND AMPHETAMINE SULFATE 5; 5; 5; 5 MG/1; MG/1; MG/1; MG/1
CAPSULE, EXTENDED RELEASE ORAL
Qty: 60 CAP | Refills: 0 | Status: SHIPPED | OUTPATIENT
Start: 2018-05-27 | End: 2018-06-28 | Stop reason: SDUPTHER

## 2018-05-31 ENCOUNTER — OFFICE VISIT (OUTPATIENT)
Dept: INTERNAL MEDICINE CLINIC | Age: 38
End: 2018-05-31

## 2018-05-31 VITALS
TEMPERATURE: 98.9 F | HEIGHT: 65 IN | RESPIRATION RATE: 18 BRPM | HEART RATE: 59 BPM | BODY MASS INDEX: 21.99 KG/M2 | WEIGHT: 132 LBS | SYSTOLIC BLOOD PRESSURE: 112 MMHG | OXYGEN SATURATION: 99 % | DIASTOLIC BLOOD PRESSURE: 66 MMHG

## 2018-05-31 DIAGNOSIS — G89.29 CHRONIC BILATERAL LOW BACK PAIN WITHOUT SCIATICA: ICD-10-CM

## 2018-05-31 DIAGNOSIS — M54.50 CHRONIC BILATERAL LOW BACK PAIN WITHOUT SCIATICA: ICD-10-CM

## 2018-05-31 DIAGNOSIS — Z00.00 ANNUAL PHYSICAL EXAM: Primary | ICD-10-CM

## 2018-05-31 DIAGNOSIS — F98.8 ADD (ATTENTION DEFICIT DISORDER) WITHOUT HYPERACTIVITY: ICD-10-CM

## 2018-05-31 RX ORDER — FLUOXETINE HYDROCHLORIDE 20 MG/1
CAPSULE ORAL
Qty: 30 CAP | Refills: 2 | Status: SHIPPED | OUTPATIENT
Start: 2018-05-31 | End: 2018-09-11 | Stop reason: SDUPTHER

## 2018-05-31 RX ORDER — CYCLOBENZAPRINE HCL 10 MG
TABLET ORAL
Qty: 30 TAB | Refills: 1 | Status: SHIPPED | OUTPATIENT
Start: 2018-05-31 | End: 2019-02-08 | Stop reason: ALTCHOICE

## 2018-05-31 NOTE — PROGRESS NOTES
The patient presents to the office today for a check up    HPI    The patient presents to the office today for a check up. The patient remains on medications for ADD. She is doing ok on the medications. The patient complains of chronic low back. Recently this has worsened. This is associated with stiffness. No radiation to the pain. The patient finds that she is becoming barillas. At times she feels \"unhappy. \"      Review of Systems   Respiratory: Negative for shortness of breath. Cardiovascular: Negative for palpitations and leg swelling. Allergies   Allergen Reactions    Buspar [Buspirone] Anaphylaxis and Other (comments)     Dizziness  Lightheadedness       Current Outpatient Prescriptions   Medication Sig Dispense Refill    cyclobenzaprine (FLEXERIL) 10 mg tablet 1/2 tablet to 1 tablet at bedtime 30 Tab 1    FLUoxetine (PROZAC) 20 mg capsule 1 capsule daily 30 Cap 2    amphetamine-dextroamphetamine XR (ADDERALL XR) 20 mg XR capsule Earliest Fill Date: 5/27/18.  1 tablet twice per day 60 Cap 0    acyclovir (ZOVIRAX) 400 mg tablet Take 1 Tab by mouth every four (4) hours (while awake). 60 Tab 0    naproxen (NAPROSYN) 500 mg tablet Take 1 Tab by mouth two (2) times daily (with meals). 20 Tab 0       Past Medical History:   Diagnosis Date    Acute reaction to stress     ADD (attention deficit disorder) without hyperactivity     Depressive disorder     GERD (gastroesophageal reflux disease)     Influenza     Low back pain     Sciatica     Sinusitis     Spasm of muscle     Strain of knee        Past Surgical History:   Procedure Laterality Date    HX TONSILLECTOMY      HX WISDOM TEETH EXTRACTION         Social History     Social History    Marital status:      Spouse name: N/A    Number of children: N/A    Years of education: N/A     Occupational History    Not on file.      Social History Main Topics    Smoking status: Never Smoker    Smokeless tobacco: Never Used   [place] Alcohol use Yes      Comment: socially    Drug use: No    Sexual activity: Yes     Other Topics Concern    Not on file     Social History Narrative       Patient does not have an advanced directive on file    Visit Vitals    /66 (BP 1 Location: Left arm, BP Patient Position: Sitting)    Pulse (!) 59    Temp 98.9 °F (37.2 °C) (Tympanic)    Resp 18    Ht 5' 5\" (1.651 m)    Wt 132 lb (59.9 kg)    SpO2 99%    BMI 21.97 kg/m2       Physical Exam   No Cervical Lymphadenopathy  No Supraclavicular Lymphadenopathy  Thyroid is Normal  Lungs are normal to percussion. Clear to auscultation   Heart:  S1 S2 are normal, No gallops, No mummers  No Carotid Bruits  Abdomen:  Normal Bowel Sounds. No tenderness. No masses. No Hepatomegaly or Splenomegly  LE:  Strong Pedal Pulses. No Edema  Tight muscles on both sides of the lumbar area    BMI:  OK    No visits with results within 3 Month(s) from this visit. Latest known visit with results is:    Hospital Outpatient Visit on 08/22/2017   Component Date Value Ref Range Status    WBC 08/22/2017 5.5  4.6 - 13.2 K/uL Final    RBC 08/22/2017 4.06* 4.20 - 5.30 M/uL Final    HGB 08/22/2017 13.7  12.0 - 16.0 g/dL Final    HCT 08/22/2017 41.0  35.0 - 45.0 % Final    MCV 08/22/2017 101.0* 74.0 - 97.0 FL Final    MCH 08/22/2017 33.7  24.0 - 34.0 PG Final    MCHC 08/22/2017 33.4  31.0 - 37.0 g/dL Final    RDW 08/22/2017 12.0  11.6 - 14.5 % Final    PLATELET 94/79/6030 268  135 - 420 K/uL Final    MPV 08/22/2017 10.7  9.2 - 11.8 FL Final    NEUTROPHILS 08/22/2017 59  40 - 73 % Final    LYMPHOCYTES 08/22/2017 31  21 - 52 % Final    MONOCYTES 08/22/2017 8  3 - 10 % Final    EOSINOPHILS 08/22/2017 1  0 - 5 % Final    BASOPHILS 08/22/2017 1  0 - 2 % Final    ABS. NEUTROPHILS 08/22/2017 3.2  1.8 - 8.0 K/UL Final    ABS. LYMPHOCYTES 08/22/2017 1.7  0.9 - 3.6 K/UL Final    ABS. MONOCYTES 08/22/2017 0.4  0.05 - 1.2 K/UL Final    ABS.  EOSINOPHILS 08/22/2017 0.1  0.0 - 0.4 K/UL Final    ABS. BASOPHILS 08/22/2017 0.0  0.0 - 0.06 K/UL Final    DF 08/22/2017 AUTOMATED    Final    TSH 08/22/2017 0.87  0.36 - 3.74 uIU/mL Final    Methylmalonic acid 08/22/2017 158  0 - 378 nmol/L Final    Comment: (NOTE)  Performed At: [address] [id number]  Pedro Pablo Quiroga MD CY:[id number]      C-Reactive protein 08/22/2017 <0.3  0 - 0.3 mg/dL Final    Sed rate, automated 08/22/2017 4  0 - 20 mm/hr Final       .No results found for any visits on 05/31/18. Assessment / Plan      ICD-10-CM ICD-9-CM    1. Annual physical exam Z00.00 V70.0    2. ADD (attention deficit disorder) without hyperactivity F98.8 314.00    3. Chronic bilateral low back pain without sciatica M54.5 724.2 XR SPINE LUMB MIN 4 V    G89.29 338.29      XR LS spine  Flexeril  Prozac  I advised the patient to continue good health habits  she was advised to continue her maintenance medications    Follow-up Disposition:  Return in about 6 months (around 11/30/2018). I asked Jannet Amaya if she has any questions and I answered the questions.   Jannet Amaya states that she understands the treatment plan and agrees with the treatment plan

## 2018-05-31 NOTE — PROGRESS NOTES
1. Have you been to the ER, urgent care clinic since your last visit? Hospitalized since your last visit? No    2. Have you seen or consulted any other health care providers outside of the [address] [place] [address] since your last visit? Include any pap smears or colon screening.  No

## 2018-06-15 ENCOUNTER — OFFICE VISIT (OUTPATIENT)
Dept: ORTHOPEDIC SURGERY | Age: 38
End: 2018-06-15

## 2018-06-15 VITALS
DIASTOLIC BLOOD PRESSURE: 79 MMHG | HEART RATE: 65 BPM | HEIGHT: 65 IN | WEIGHT: 133 LBS | SYSTOLIC BLOOD PRESSURE: 112 MMHG | BODY MASS INDEX: 22.16 KG/M2

## 2018-06-15 DIAGNOSIS — M54.50 LOW BACK PAIN WITHOUT SCIATICA, UNSPECIFIED BACK PAIN LATERALITY, UNSPECIFIED CHRONICITY: Primary | ICD-10-CM

## 2018-06-15 DIAGNOSIS — M47.817 LUMBOSACRAL SPONDYLOSIS WITHOUT MYELOPATHY: ICD-10-CM

## 2018-06-15 DIAGNOSIS — M51.36 DDD (DEGENERATIVE DISC DISEASE), LUMBAR: ICD-10-CM

## 2018-06-15 RX ORDER — METHYLPREDNISOLONE 4 MG/1
TABLET ORAL
Qty: 1 DOSE PACK | Refills: 0 | Status: SHIPPED | OUTPATIENT
Start: 2018-06-15 | End: 2019-05-31 | Stop reason: ALTCHOICE

## 2018-06-15 NOTE — PROGRESS NOTES
[facility]  [address], [address]  Phone: [phone]  Fax: [phone]        INITIAL CONSULTATION      HISTORY OF PRESENT ILLNESS:  Oksana Lewis is a 30 y.o. female whom is self referred secondary to recurrence of low back pain x 1 year, progressive in nature. Pt additionally endorses bilateral hip/groin pain primarily present in the PM. Pt denies recent injury. She rates her pain 4.5-8/10. Her pain is exacerbated by lying down, lifting and bending. This is a former patient of mine whom I previously saw on 7/24/2014 for low back pain extending into the LLE. At that time, she was doing well s/p L4/5 epidural and did not believe her sxs were severe to warrant additional workup/treatment. Note from Dr. Suad Nguyen dated 5/31/2018 indicating patient was seen with c/o chronic low back pain without radiculopathy with associated stiffness, recent worsening. According to his note, her ESR and CRP from 8/2017 were normal. Pt is treating Flexeril prn and Ibuprofen 1,000 mg daily with good relief. Pt denies change in bowel or bladder habits. She denies possibility of pregnancy or breastfeeding (IUD). Denies hx of lumbar surgery. Pt attended physical therapy multiple years ago with some relief but d/c her HEP. She does engage in yoga daily. Pt denies hx of THR. Pt reports some recent fevers. Pt denies weight loss or skin changes. Lumbar spine MRI dated 3/19/15 reviewed. Per report, at L4-L5: Mild disc desiccation and mild circumferential nonfocal disc bulge. Small central right paracentral disc annular tear. Mild facet arthropathy. No central canal stenosis. Mild foraminal stenosis. At L5-S1: Grade 1 retrolisthesis of L5 on S1 with uncovering of the disc posteriorly. Mild overlying broad-based disc protrusion. Mild facet arthropathy. Mild foraminal stenosis. The crossing S1 nerve roots are abutted but not displaced. The patient is RHD.  reviewed.  Body mass index is 22.13 kg/(m^2). PCP: Charley Apple MD    Past Medical History:   Diagnosis Date    Acute reaction to stress     ADD (attention deficit disorder) without hyperactivity     Depressive disorder     GERD (gastroesophageal reflux disease)     Influenza     Low back pain     Sciatica     Sinusitis     Spasm of muscle     Strain of knee           Past Surgical History:   Procedure Laterality Date    HX TONSILLECTOMY      HX WISDOM TEETH EXTRACTION           Social History   Substance Use Topics    Smoking status: Never Smoker    Smokeless tobacco: Never Used    Alcohol use Yes      Comment: socially     Work status: The patient is employed. Marital status: The patient is . Current Outpatient Prescriptions   Medication Sig Dispense Refill    methylPREDNISolone (MEDROL DOSEPACK) 4 mg tablet Per dose pack instructions 1 Dose Pack 0    cyclobenzaprine (FLEXERIL) 10 mg tablet 1/2 tablet to 1 tablet at bedtime 30 Tab 1    FLUoxetine (PROZAC) 20 mg capsule 1 capsule daily 30 Cap 2    amphetamine-dextroamphetamine XR (ADDERALL XR) 20 mg XR capsule Earliest Fill Date: 5/27/18.  1 tablet twice per day 60 Cap 0    acyclovir (ZOVIRAX) 400 mg tablet Take 1 Tab by mouth every four (4) hours (while awake). 60 Tab 0    naproxen (NAPROSYN) 500 mg tablet Take 1 Tab by mouth two (2) times daily (with meals). 20 Tab 0       Allergies   Allergen Reactions    Buspar [Buspirone] Anaphylaxis and Other (comments)     Dizziness  Lightheadedness            Family History   Problem Relation Age of Onset    Thyroid Disease Other     Thyroid Disease Mother     Cancer Father      skin         REVIEW OF SYSTEMS  Constitutional symptoms: Negative  Eyes: Negative  Ears, Nose, Throat, and Mouth: Negative  Cardiovascular: Negative  Respiratory: Negative  Genitourinary: Negative  Integumentary (Skin and/or breast): Negative  Musculoskeletal: Positive for low back pain, b/l hip/groin pain.    Extremities: Negative for edema.  Endocrine/Rheumatologic: Negative  Hematologic/Lymphatic: Negative  Allergic/Immunologic: Negative  Psychiatric: Negative       PHYSICAL EXAMINATION    Visit Vitals    /79    Pulse 65    Ht 5' 5\" (1.651 m)    Wt 60.3 kg (133 lb)    BMI 22.13 kg/m2       CONSTITUTIONAL: NAD, A&O x 3  HEART: Regular rate and rhythm  ABDOMEN: Positive bowel sounds, soft, nontender, and nondistended  LUNGS: Clear to auscultation bilaterally. SKIN: Negative for rash. RANGE OF MOTION: The patient has full passive range of motion in all four extremities. SENSATION: Sensation is intact to light touch throughout. MOTOR:   Straight Leg Raise: Negative, bilateral  Thomson: Negative, bilateral   LONG SIGN: Negative, bilateral  Deep tendon reflexes are 1+ at the brachioradialis, 2+ at the biceps and at triceps bilaterally. Deep tendon reflexes are 2+ at the knees and ankles bilaterally. Shoulder AB/Flex Elbow Flex Wrist Ext Elbow Ext Wrist Flex Hand Intrin Tone   Right +4/5 +4/5 +4/5 +4/5 +4/5 +4/5 +4/5   Left +4/5 +4/5 +4/5 +4/5 +4/5 +4/5 +4/5              Hip Flex Knee Ext Knee Flex Ankle DF GTE Ankle PF Tone   Right +4/5 +4/5 +4/5 +4/5 +4/5 +4/5 +4/5   Left +4/5 +4/5 +4/5 +4/5 +4/5 +4/5 +4/5     RADIOGRAPHS  Preliminary reading of lumbar plain films:  Mild disc space narrowing at L4-5, moderate to severe L5-S1. Small anterior osteophytes at L3-L4. Previously noted anterolisthesis at L5-S1 was not noted on plain films today. No gross instability noted on flexion/extension films. These are being sent out for official reading by Dr. Lacie Reyes. ASSESSMENT   Diagnoses and all orders for this visit:    1. Low back pain without sciatica, unspecified back pain laterality, unspecified chronicity  -     [10830] LS Spine 4V    2. Lumbosacral spondylosis without myelopathy    3.  DDD (degenerative disc disease), lumbar    Other orders  -     methylPREDNISolone (MEDROL DOSEPACK) 4 mg tablet; Per dose pack instructions           IMPRESSIONS/RECOMMENDATIONS:  The patient presents for recurring low back and bilateral hip/groin pain x 1 year. She is neurologically intact. I will start her on Medrol Dosepak. Patient will resume her Ibuprofen 800 mg TID x 2 weeks following completion of MDP. I advise patient resume her HEP and complete daily. I will see the patient back in 1 month's time or earlier if needed. Written by Lilian Cat, as dictated by [place], MD  I examined the patient, reviewed and agree with the note.

## 2018-06-15 NOTE — PROGRESS NOTES
[facility]  [address], [address]  Phone: [phone]  Fax: [phone]        INITIAL CONSULTATION      HISTORY OF PRESENT ILLNESS:  Denise Camilo is a 30 y.o. female whom is self-referred secondary to recurrence of lumbar pain x 1 year. Pt experiences bilateral hip/groin pain primarily in the PM. Denies recent injury. She rates her pain 4.5-8/10. Her pain is exacerbated by lying down, lifting and bending. This is a former patient of mine whom I previously saw on 7/24/2014 for low back pain extending into the LLE. At that time, she was doing well s/p L4/5 epidural and did not believe her sxs were severe to warrant additional workup/treatment. Note from Dr. Trisha Peters dated 5/31/2018 indicating patient was seen with c/o chronic low back pain without radiculopathy with associated stiffness, recent worsening. According to his note, her ESR and CRP from 8/2017 were normal. Pt is treating Flexeril prn and Ibuprofen 1,000 mg daily with good relief. The patient is RHD.  reviewed. Body mass index is 22.13 kg/(m^2). PCP: Alan Vital MD    Past Medical History:   Diagnosis Date    Acute reaction to stress     ADD (attention deficit disorder) without hyperactivity     Depressive disorder     GERD (gastroesophageal reflux disease)     Influenza     Low back pain     Sciatica     Sinusitis     Spasm of muscle     Strain of knee         Past Surgical History:   Procedure Laterality Date    HX TONSILLECTOMY      HX WISDOM TEETH EXTRACTION         Social History   Substance Use Topics    Smoking status: Never Smoker    Smokeless tobacco: Never Used    Alcohol use Yes      Comment: socially     Work status: The patient is ***. Marital status: The patient is ***.      Current Outpatient Prescriptions   Medication Sig Dispense Refill    cyclobenzaprine (FLEXERIL) 10 mg tablet 1/2 tablet to 1 tablet at bedtime 30 Tab 1    FLUoxetine (PROZAC) 20 mg capsule 1 capsule daily 30 Cap 2    amphetamine-dextroamphetamine XR (ADDERALL XR) 20 mg XR capsule Earliest Fill Date: 5/27/18.  1 tablet twice per day 60 Cap 0    acyclovir (ZOVIRAX) 400 mg tablet Take 1 Tab by mouth every four (4) hours (while awake). 60 Tab 0    naproxen (NAPROSYN) 500 mg tablet Take 1 Tab by mouth two (2) times daily (with meals). 20 Tab 0       Allergies   Allergen Reactions    Buspar [Buspirone] Anaphylaxis and Other (comments)     Dizziness  Lightheadedness          Family History   Problem Relation Age of Onset    Thyroid Disease Other     Thyroid Disease Mother     Cancer Father      skin         REVIEW OF SYSTEMS  Constitutional symptoms: Negative  Eyes: Negative  Ears, Nose, Throat, and Mouth: Negative  Cardiovascular: Negative  Respiratory: Negative  Genitourinary: Negative  Integumentary (Skin and/or breast): Negative  Musculoskeletal: Positive for ***  Extremities: Negative for edema. Endocrine/Rheumatologic: Negative  Hematologic/Lymphatic: Negative  Allergic/Immunologic: Negative  Psychiatric: Negative       PHYSICAL EXAMINATION  Visit Vitals    /79    Pulse 65    Ht 5' 5\" (1.651 m)    Wt 133 lb (60.3 kg)    BMI 22.13 kg/m2       CONSTITUTIONAL: NAD, A&O x 3  HEART: Regular rate and rhythm  ABDOMEN: Positive bowel sounds, soft, nontender, and nondistended  LUNGS: Clear to auscultation bilaterally. SKIN: Negative for rash. RANGE OF MOTION: The patient has full passive range of motion in all four extremities. SENSATION: Sensation is intact to light touch throughout. MOTOR:   Straight Leg Raise: {Pos/neg/not test:680245::\"Negative\"}, {right/left:02219}  Thomson: {Pos/neg/not test:347322::\"Negative\"}, {right/left:23774}  Tandem Gait: {Blank Single Select Template:20061::\"Neg. \",\"Mild LOB\",\"Severe LOB\"}   Deep tendon reflexes are 2+ at the brachioradialis, biceps, and triceps. Deep tendon reflexes are 2+ at the knees and ankles bilaterally.     *** Shoulder AB/Flex Elbow Flex Wrist Ext Elbow Ext Wrist Flex Hand Intrin Tone   Right +4/5 +4/5 +4/5 +4/5 +4/5 +4/5 +4/5   Left +4/5 +4/5 +4/5 +4/5 +4/5 +4/5 +4/5             *** Hip Flex Knee Ext Knee Flex Ankle DF GTE Ankle PF Tone   Right +4/5 +4/5 +4/5 +4/5 +4/5 +4/5 +4/5   Left +4/5 +4/5 +4/5 +4/5 +4/5 +4/5 +4/5         ASSESSMENT   {There are no diagnoses linked to this encounter. (Refresh or delete this SmartLink)}       IMPRESSIONS/RECOMMENDATIONS:  *** I will see the patient back in *** month's time or earlier if needed. Written by Ashley Linares, as dictated by Shawnee Syed MD  I examined the patient, reviewed and agree with the note.

## 2018-06-28 DIAGNOSIS — F98.8 ADD (ATTENTION DEFICIT DISORDER) WITHOUT HYPERACTIVITY: ICD-10-CM

## 2018-06-29 RX ORDER — DEXTROAMPHETAMINE SACCHARATE, AMPHETAMINE ASPARTATE MONOHYDRATE, DEXTROAMPHETAMINE SULFATE AND AMPHETAMINE SULFATE 5; 5; 5; 5 MG/1; MG/1; MG/1; MG/1
CAPSULE, EXTENDED RELEASE ORAL
Qty: 60 CAP | Refills: 0 | Status: SHIPPED | OUTPATIENT
Start: 2018-06-29 | End: 2018-08-03 | Stop reason: SDUPTHER

## 2018-06-29 NOTE — TELEPHONE ENCOUNTER
From: Sheree Dubois  To: Inge Davis MD  Sent: 6/28/2018 4:09 PM EDT  Subject: Medication Renewal Request    Original authorizing provider: MD Sheree Verduzco would like a refill of the following medications:  amphetamine-dextroamphetamine XR (ADDERALL XR) 20 mg XR capsule Inge Davis MD]    Preferred pharmacy: [address], [address] [facility]    Comment:  Please call when script is ready and I will come to the office to . Thank you.  [phone]

## 2018-07-24 DIAGNOSIS — Z00.00 WELL ADULT EXAM: ICD-10-CM

## 2018-07-24 NOTE — TELEPHONE ENCOUNTER
Requested Prescriptions     Pending Prescriptions Disp Refills    acyclovir (ZOVIRAX) 400 mg tablet 60 Tab 0     Sig: Take 1 Tab by mouth every four (4) hours (while awake).           PATIENT WOULD LIKE TO KNOW IF SHE CAN GET THAT TODAY SHE IS STARTING TO GET A LARGE COLD SORE

## 2018-07-25 RX ORDER — ACYCLOVIR 400 MG/1
400 TABLET ORAL
Qty: 60 TAB | Refills: 0 | Status: SHIPPED | OUTPATIENT
Start: 2018-07-25 | End: 2021-06-04 | Stop reason: SDUPTHER

## 2018-08-03 DIAGNOSIS — F98.8 ADD (ATTENTION DEFICIT DISORDER) WITHOUT HYPERACTIVITY: ICD-10-CM

## 2018-08-03 RX ORDER — DEXTROAMPHETAMINE SACCHARATE, AMPHETAMINE ASPARTATE MONOHYDRATE, DEXTROAMPHETAMINE SULFATE AND AMPHETAMINE SULFATE 5; 5; 5; 5 MG/1; MG/1; MG/1; MG/1
CAPSULE, EXTENDED RELEASE ORAL
Qty: 60 CAP | Refills: 0 | Status: SHIPPED | OUTPATIENT
Start: 2018-08-03 | End: 2018-09-04 | Stop reason: SDUPTHER

## 2018-08-03 NOTE — TELEPHONE ENCOUNTER
From: Alex Segovia  To: Jeanne Crain MD  Sent: 8/3/2018 1:02 PM EDT  Subject: Medication Renewal Request    Original authorizing provider: MD Alex Harman would like a refill of the following medications:  amphetamine-dextroamphetamine XR (ADDERALL XR) 20 mg XR capsule Jeanne Crain MD]    Preferred pharmacy: Other - Please call [phone] when script is ready.     Comment:

## 2018-09-04 DIAGNOSIS — F98.8 ADD (ATTENTION DEFICIT DISORDER) WITHOUT HYPERACTIVITY: ICD-10-CM

## 2018-09-05 RX ORDER — DEXTROAMPHETAMINE SACCHARATE, AMPHETAMINE ASPARTATE MONOHYDRATE, DEXTROAMPHETAMINE SULFATE AND AMPHETAMINE SULFATE 5; 5; 5; 5 MG/1; MG/1; MG/1; MG/1
CAPSULE, EXTENDED RELEASE ORAL
Qty: 60 CAP | Refills: 0 | Status: SHIPPED | OUTPATIENT
Start: 2018-09-05 | End: 2018-10-02 | Stop reason: SDUPTHER

## 2018-09-05 NOTE — TELEPHONE ENCOUNTER
From: Alex Segovia  To: Jeanne Crain MD  Sent: 9/4/2018 11:49 AM EDT  Subject: Medication Renewal Request    Original authorizing provider: MD Alex Harman would like a refill of the following medications:  amphetamine-dextroamphetamine XR (ADDERALL XR) 20 mg XR capsule Jeanne Crain MD]    Preferred pharmacy: [address] [place] [address] [facility]    Comment:

## 2018-09-12 RX ORDER — FLUOXETINE HYDROCHLORIDE 20 MG/1
CAPSULE ORAL
Qty: 30 CAP | Refills: 0 | Status: SHIPPED | OUTPATIENT
Start: 2018-09-12 | End: 2018-10-14 | Stop reason: SDUPTHER

## 2018-10-02 DIAGNOSIS — F98.8 ADD (ATTENTION DEFICIT DISORDER) WITHOUT HYPERACTIVITY: ICD-10-CM

## 2018-10-04 RX ORDER — DEXTROAMPHETAMINE SACCHARATE, AMPHETAMINE ASPARTATE MONOHYDRATE, DEXTROAMPHETAMINE SULFATE AND AMPHETAMINE SULFATE 5; 5; 5; 5 MG/1; MG/1; MG/1; MG/1
CAPSULE, EXTENDED RELEASE ORAL
Qty: 60 CAP | Refills: 0 | Status: SHIPPED | OUTPATIENT
Start: 2018-10-04 | End: 2018-11-05 | Stop reason: SDUPTHER

## 2018-10-04 NOTE — TELEPHONE ENCOUNTER
From: Lexy Isaacs  To: Celsa Thornton MD  Sent: 10/2/2018 4:45 PM EDT  Subject: Medication Renewal Request    Original authorizing provider: MD Lexy Glover would like a refill of the following medications:  amphetamine-dextroamphetamine XR (ADDERALL XR) 20 mg XR capsule Celsa Thornton MD]    Preferred pharmacy: [facility]  Kathie Dodd up at office    Comment:  Once ready, please call [phone] and I will  at the office. Thank you.

## 2018-10-14 RX ORDER — FLUOXETINE HYDROCHLORIDE 20 MG/1
CAPSULE ORAL
Qty: 30 CAP | Refills: 0 | Status: SHIPPED | OUTPATIENT
Start: 2018-10-14 | End: 2019-01-03 | Stop reason: SDUPTHER

## 2018-11-02 DIAGNOSIS — F98.8 ADD (ATTENTION DEFICIT DISORDER) WITHOUT HYPERACTIVITY: ICD-10-CM

## 2018-11-02 NOTE — TELEPHONE ENCOUNTER
Requested Prescriptions     Pending Prescriptions Disp Refills    amphetamine-dextroamphetamine XR (ADDERALL XR) 20 mg XR capsule 60 Cap 0     Sig: Earliest Fill Date: 11/2/18.  1 tablet twice per day

## 2018-11-05 DIAGNOSIS — F98.8 ADD (ATTENTION DEFICIT DISORDER) WITHOUT HYPERACTIVITY: ICD-10-CM

## 2018-11-05 RX ORDER — DEXTROAMPHETAMINE SACCHARATE, AMPHETAMINE ASPARTATE MONOHYDRATE, DEXTROAMPHETAMINE SULFATE AND AMPHETAMINE SULFATE 5; 5; 5; 5 MG/1; MG/1; MG/1; MG/1
CAPSULE, EXTENDED RELEASE ORAL
Qty: 60 CAP | Refills: 0 | Status: SHIPPED | OUTPATIENT
Start: 2018-11-05 | End: 2019-01-03 | Stop reason: SDUPTHER

## 2018-11-05 RX ORDER — DEXTROAMPHETAMINE SACCHARATE, AMPHETAMINE ASPARTATE MONOHYDRATE, DEXTROAMPHETAMINE SULFATE AND AMPHETAMINE SULFATE 5; 5; 5; 5 MG/1; MG/1; MG/1; MG/1
CAPSULE, EXTENDED RELEASE ORAL
Qty: 60 CAP | Refills: 0 | Status: SHIPPED | OUTPATIENT
Start: 2018-11-05 | End: 2019-02-07 | Stop reason: SDUPTHER

## 2018-12-07 ENCOUNTER — HOSPITAL ENCOUNTER (OUTPATIENT)
Dept: MAMMOGRAPHY | Age: 38
Discharge: HOME | End: 2018-12-07
Attending: NURSE PRACTITIONER
Payer: COMMERCIAL

## 2018-12-07 ENCOUNTER — HOSPITAL ENCOUNTER (OUTPATIENT)
Dept: ULTRASOUND IMAGING | Age: 38
Discharge: HOME | End: 2018-12-07
Attending: NURSE PRACTITIONER
Payer: COMMERCIAL

## 2018-12-07 DIAGNOSIS — N63.0 BREAST LUMP: ICD-10-CM

## 2018-12-07 PROCEDURE — 76642 ULTRASOUND BREAST LIMITED: CPT

## 2018-12-07 PROCEDURE — 77062 BREAST TOMOSYNTHESIS BI: CPT

## 2019-01-03 DIAGNOSIS — F98.8 ADD (ATTENTION DEFICIT DISORDER) WITHOUT HYPERACTIVITY: ICD-10-CM

## 2019-01-06 RX ORDER — FLUOXETINE HYDROCHLORIDE 20 MG/1
CAPSULE ORAL
Qty: 90 CAP | Refills: 3 | Status: SHIPPED | OUTPATIENT
Start: 2019-01-06 | End: 2019-02-08 | Stop reason: ALTCHOICE

## 2019-01-06 RX ORDER — DEXTROAMPHETAMINE SACCHARATE, AMPHETAMINE ASPARTATE MONOHYDRATE, DEXTROAMPHETAMINE SULFATE AND AMPHETAMINE SULFATE 5; 5; 5; 5 MG/1; MG/1; MG/1; MG/1
CAPSULE, EXTENDED RELEASE ORAL
Qty: 60 CAP | Refills: 0 | Status: SHIPPED | OUTPATIENT
Start: 2019-01-06 | End: 2019-02-06 | Stop reason: SDUPTHER

## 2019-01-09 ENCOUNTER — CLINICAL SUPPORT (OUTPATIENT)
Dept: INTERNAL MEDICINE CLINIC | Age: 39
End: 2019-01-09

## 2019-01-09 DIAGNOSIS — Z23 ENCOUNTER FOR IMMUNIZATION: Primary | ICD-10-CM

## 2019-02-06 DIAGNOSIS — F98.8 ADD (ATTENTION DEFICIT DISORDER) WITHOUT HYPERACTIVITY: ICD-10-CM

## 2019-02-06 RX ORDER — DEXTROAMPHETAMINE SACCHARATE, AMPHETAMINE ASPARTATE MONOHYDRATE, DEXTROAMPHETAMINE SULFATE AND AMPHETAMINE SULFATE 5; 5; 5; 5 MG/1; MG/1; MG/1; MG/1
CAPSULE, EXTENDED RELEASE ORAL
Qty: 60 CAP | Refills: 0 | Status: SHIPPED | OUTPATIENT
Start: 2019-02-06 | End: 2019-02-07 | Stop reason: SDUPTHER

## 2019-02-07 ENCOUNTER — HOSPITAL ENCOUNTER (OUTPATIENT)
Dept: GENERAL RADIOLOGY | Age: 39
Discharge: HOME | End: 2019-02-07
Payer: COMMERCIAL

## 2019-02-07 ENCOUNTER — HOSPITAL ENCOUNTER (OUTPATIENT)
Dept: LAB | Age: 39
Discharge: HOME | End: 2019-02-07
Payer: COMMERCIAL

## 2019-02-07 ENCOUNTER — OFFICE VISIT (OUTPATIENT)
Dept: INTERNAL MEDICINE CLINIC | Age: 39
End: 2019-02-07

## 2019-02-07 VITALS
HEART RATE: 85 BPM | BODY MASS INDEX: 22.66 KG/M2 | DIASTOLIC BLOOD PRESSURE: 80 MMHG | TEMPERATURE: 98.9 F | HEIGHT: 65 IN | OXYGEN SATURATION: 98 % | WEIGHT: 136 LBS | SYSTOLIC BLOOD PRESSURE: 118 MMHG

## 2019-02-07 DIAGNOSIS — J40 BRONCHITIS: ICD-10-CM

## 2019-02-07 DIAGNOSIS — J40 BRONCHITIS: Primary | ICD-10-CM

## 2019-02-07 DIAGNOSIS — F98.8 ADD (ATTENTION DEFICIT DISORDER) WITHOUT HYPERACTIVITY: ICD-10-CM

## 2019-02-07 LAB
ALBUMIN SERPL-MCNC: 3.8 G/DL (ref 3.4–5)
ALBUMIN/GLOB SERPL: 1.3 {RATIO} (ref 0.8–1.7)
ALP SERPL-CCNC: 72 U/L (ref 45–117)
ALT SERPL-CCNC: 32 U/L (ref 13–56)
ANION GAP SERPL CALC-SCNC: 7 MMOL/L (ref 3–18)
AST SERPL-CCNC: 17 U/L (ref 15–37)
BASOPHILS # BLD: 0 K/UL (ref 0–0.1)
BASOPHILS NFR BLD: 0 % (ref 0–2)
BILIRUB SERPL-MCNC: 0.6 MG/DL (ref 0.2–1)
BUN SERPL-MCNC: 12 MG/DL (ref 7–18)
BUN/CREAT SERPL: 19 (ref 12–20)
CALCIUM SERPL-MCNC: 8.8 MG/DL (ref 8.5–10.1)
CHLORIDE SERPL-SCNC: 106 MMOL/L (ref 100–108)
CO2 SERPL-SCNC: 28 MMOL/L (ref 21–32)
CREAT SERPL-MCNC: 0.63 MG/DL (ref 0.6–1.3)
DIFFERENTIAL METHOD BLD: ABNORMAL
EOSINOPHIL # BLD: 0 K/UL (ref 0–0.4)
EOSINOPHIL NFR BLD: 0 % (ref 0–5)
ERYTHROCYTE [DISTWIDTH] IN BLOOD BY AUTOMATED COUNT: 12 % (ref 11.6–14.5)
GLOBULIN SER CALC-MCNC: 2.9 G/DL (ref 2–4)
GLUCOSE SERPL-MCNC: 77 MG/DL (ref 74–99)
HCT VFR BLD AUTO: 36.7 % (ref 35–45)
HGB BLD-MCNC: 12.5 G/DL (ref 12–16)
LYMPHOCYTES # BLD: 3.2 K/UL (ref 0.9–3.6)
LYMPHOCYTES NFR BLD: 22 % (ref 21–52)
MCH RBC QN AUTO: 33.2 PG (ref 24–34)
MCHC RBC AUTO-ENTMCNC: 34.1 G/DL (ref 31–37)
MCV RBC AUTO: 97.6 FL (ref 74–97)
MONOCYTES # BLD: 0.8 K/UL (ref 0.05–1.2)
MONOCYTES NFR BLD: 5 % (ref 3–10)
NEUTS SEG # BLD: 10.1 K/UL (ref 1.8–8)
NEUTS SEG NFR BLD: 73 % (ref 40–73)
PLATELET # BLD AUTO: 292 K/UL (ref 135–420)
PMV BLD AUTO: 9.8 FL (ref 9.2–11.8)
POTASSIUM SERPL-SCNC: 3.6 MMOL/L (ref 3.5–5.5)
PROT SERPL-MCNC: 6.7 G/DL (ref 6.4–8.2)
RBC # BLD AUTO: 3.76 M/UL (ref 4.2–5.3)
SODIUM SERPL-SCNC: 141 MMOL/L (ref 136–145)
WBC # BLD AUTO: 14.1 K/UL (ref 4.6–13.2)

## 2019-02-07 PROCEDURE — 36415 COLL VENOUS BLD VENIPUNCTURE: CPT

## 2019-02-07 PROCEDURE — 80053 COMPREHEN METABOLIC PANEL: CPT

## 2019-02-07 PROCEDURE — 71046 X-RAY EXAM CHEST 2 VIEWS: CPT

## 2019-02-07 PROCEDURE — 85025 COMPLETE CBC W/AUTO DIFF WBC: CPT

## 2019-02-07 RX ORDER — PREDNISONE 20 MG/1
TABLET ORAL
Qty: 20 TAB | Refills: 0 | Status: SHIPPED | OUTPATIENT
Start: 2019-02-07 | End: 2019-05-31 | Stop reason: ALTCHOICE

## 2019-02-07 RX ORDER — FLUCONAZOLE 150 MG/1
TABLET ORAL
Refills: 0 | COMMUNITY
Start: 2018-11-20 | End: 2019-02-21 | Stop reason: SDUPTHER

## 2019-02-07 RX ORDER — DEXTROAMPHETAMINE SACCHARATE, AMPHETAMINE ASPARTATE MONOHYDRATE, DEXTROAMPHETAMINE SULFATE AND AMPHETAMINE SULFATE 5; 5; 5; 5 MG/1; MG/1; MG/1; MG/1
CAPSULE, EXTENDED RELEASE ORAL
Qty: 60 CAP | Refills: 0 | Status: SHIPPED | OUTPATIENT
Start: 2019-02-07 | End: 2019-06-26 | Stop reason: SDUPTHER

## 2019-02-07 RX ORDER — DEXTROAMPHETAMINE SACCHARATE, AMPHETAMINE ASPARTATE MONOHYDRATE, DEXTROAMPHETAMINE SULFATE AND AMPHETAMINE SULFATE 5; 5; 5; 5 MG/1; MG/1; MG/1; MG/1
CAPSULE, EXTENDED RELEASE ORAL
Qty: 60 CAP | Refills: 0 | Status: SHIPPED | OUTPATIENT
Start: 2019-03-07 | End: 2019-04-08 | Stop reason: SDUPTHER

## 2019-02-07 RX ORDER — BENZONATATE 200 MG/1
200 CAPSULE ORAL
Qty: 20 CAP | Refills: 1 | Status: SHIPPED | OUTPATIENT
Start: 2019-02-07 | End: 2019-02-14

## 2019-02-07 RX ORDER — CLARITHROMYCIN 250 MG/1
250 TABLET, FILM COATED ORAL 2 TIMES DAILY
Qty: 20 TAB | Refills: 0 | Status: SHIPPED | OUTPATIENT
Start: 2019-02-07 | End: 2019-02-17

## 2019-02-07 RX ORDER — CEPHALEXIN 250 MG/1
CAPSULE ORAL
COMMUNITY
Start: 2019-02-03 | End: 2019-05-31 | Stop reason: ALTCHOICE

## 2019-02-07 NOTE — PROGRESS NOTES
Chief Complaint   Patient presents with    Cold Symptoms     5 days        1. Have you been to the ER, urgent care clinic since your last visit? Hospitalized since your last visit? No    2. Have you seen or consulted any other health care providers outside of the [address] [place] [address] since your last visit? Include any pap smears or colon screening.  No

## 2019-02-09 NOTE — PROGRESS NOTES
The patient presents to the office today with the chief complaint of chest congestion    HPI    The patient complains of 6 days of chest congestion with cough. The cough is non productive. The patient has no fever. The patient has mild dyspnea and chest pain with coughing. The symptoms began with scratchy sore throat with hoarseness. The patient was given Keflex but the symptoms worsened. Review of Systems   HENT: Positive for sore throat. Respiratory: Positive for cough. Negative for shortness of breath. Cardiovascular: Negative for leg swelling. Allergies   Allergen Reactions    Buspar [Buspirone] Anaphylaxis and Other (comments)     Dizziness  Lightheadedness       Current Outpatient Medications   Medication Sig Dispense Refill    cephALEXin (KEFLEX) 250 mg capsule       fluconazole (DIFLUCAN) 150 mg tablet TK 1 T PO 1 TIME FOR YEAST INFECTION. MAY REPEAT DOSE IN 3 TO 5 DAYS IF SYMPTOMS PERSISTS  0    clarithromycin (BIAXIN) 250 mg tablet Take 1 Tab by mouth two (2) times a day for 10 days. 20 Tab 0    predniSONE (DELTASONE) 20 mg tablet 3 tabs daily x 3 days, 2 tabs daily x 3 days, 1 tab daily x 3 days, 1/2 tab daily x 3 days 20 Tab 0    benzonatate (TESSALON) 200 mg capsule Take 1 Cap by mouth three (3) times daily as needed for Cough for up to 7 days. 20 Cap 1    amphetamine-dextroamphetamine XR (ADDERALL XR) 20 mg XR capsule 1 tablet twice per day 60 Cap 0    acyclovir (ZOVIRAX) 400 mg tablet Take 1 Tab by mouth every four (4) hours (while awake).  60 Tab 0    methylPREDNISolone (MEDROL DOSEPACK) 4 mg tablet Per dose pack instructions 1 Dose Pack 0    [START ON 3/7/2019] amphetamine-dextroamphetamine XR (ADDERALL XR) 20 mg XR capsule Earliest Fill Date: 3/7/19.  1 tablet twice per day 60 Cap 0       Past Medical History:   Diagnosis Date    Acute reaction to stress     ADD (attention deficit disorder) without hyperactivity     Depressive disorder     GERD (gastroesophageal reflux disease)     Influenza     Low back pain     Sciatica     Sinusitis     Spasm of muscle     Strain of knee        Past Surgical History:   Procedure Laterality Date    HX TONSILLECTOMY      HX WISDOM TEETH EXTRACTION         Social History     Socioeconomic History    Marital status:      Spouse name: Not on file    Number of children: Not on file    Years of education: Not on file    Highest education level: Not on file   Social Needs    Financial resource strain: Not on file    Food insecurity - worry: Not on file    Food insecurity - inability: Not on file   [place] needs - medical: Not on file   [employer] needs - non-medical: Not on file   Occupational History    Not on file   Tobacco Use    Smoking status: Never Smoker    Smokeless tobacco: Never Used   Substance and Sexual Activity    Alcohol use: Yes     Comment: socially    Drug use: No    Sexual activity: Yes   Other Topics Concern    Not on file   Social History Narrative    Not on file       Patient does not have an advanced directive on file    Visit Vitals  /80 (BP 1 Location: Right arm, BP Patient Position: Sitting)   Pulse 85   Temp 98.9 °F (37.2 °C) (Tympanic)   Ht 5' 5\" (1.651 m)   Wt 136 lb (61.7 kg)   SpO2 98%   BMI 22.63 kg/m²       Physical Exam   HENT:   Oral pharynx:  Mildly red   Cardiovascular: Normal rate and regular rhythm. Exam reveals no gallop. No murmur heard. Pulmonary/Chest: She has wheezes. She has rales. Abdominal: Soft. She exhibits no distension and no mass. There is no tenderness. Musculoskeletal: She exhibits no edema. Lymphadenopathy:     She has no cervical adenopathy.        BMI:  [facility] [place] Outpatient Visit on 02/07/2019   Component Date Value Ref Range Status    WBC 02/07/2019 14.1* 4.6 - 13.2 K/uL Final    RBC 02/07/2019 3.76* 4.20 - 5.30 M/uL Final    HGB 02/07/2019 12.5  12.0 - 16.0 g/dL Final    HCT 02/07/2019 36.7  35.0 - 45.0 % Final    MCV 02/07/2019 97.6* 74.0 - 97.0 FL Final    MCH 02/07/2019 33.2  24.0 - 34.0 PG Final    MCHC 02/07/2019 34.1  31.0 - 37.0 g/dL Final    RDW 02/07/2019 12.0  11.6 - 14.5 % Final    PLATELET 96/76/8125 118  135 - 420 K/uL Final    MPV 02/07/2019 9.8  9.2 - 11.8 FL Final    NEUTROPHILS 02/07/2019 73  40 - 73 % Final    LYMPHOCYTES 02/07/2019 22  21 - 52 % Final    MONOCYTES 02/07/2019 5  3 - 10 % Final    EOSINOPHILS 02/07/2019 0  0 - 5 % Final    BASOPHILS 02/07/2019 0  0 - 2 % Final    ABS. NEUTROPHILS 02/07/2019 10.1* 1.8 - 8.0 K/UL Final    ABS. LYMPHOCYTES 02/07/2019 3.2  0.9 - 3.6 K/UL Final    ABS. MONOCYTES 02/07/2019 0.8  0.05 - 1.2 K/UL Final    ABS. EOSINOPHILS 02/07/2019 0.0  0.0 - 0.4 K/UL Final    ABS. BASOPHILS 02/07/2019 0.0  0.0 - 0.1 K/UL Final    DF 02/07/2019 AUTOMATED    Final    Sodium 02/07/2019 141  136 - 145 mmol/L Final    Potassium 02/07/2019 3.6  3.5 - 5.5 mmol/L Final    Chloride 02/07/2019 106  100 - 108 mmol/L Final    CO2 02/07/2019 28  21 - 32 mmol/L Final    Anion gap 02/07/2019 7  3.0 - 18 mmol/L Final    Glucose 02/07/2019 77  74 - 99 mg/dL Final    BUN 02/07/2019 12  7.0 - 18 MG/DL Final    Creatinine 02/07/2019 0.63  0.6 - 1.3 MG/DL Final    BUN/Creatinine ratio 02/07/2019 19  12 - 20   Final    GFR est AA 02/07/2019 >60  >60 ml/min/1.73m2 Final    GFR est non-AA 02/07/2019 >60  >60 ml/min/1.73m2 Final    Comment: (NOTE)  Estimated GFR is calculated using the Modification of Diet in Renal   Disease (MDRD) Study equation, reported for both  Americans   (GFRAA) and non- Americans (GFRNA), and normalized to 1.73m2   body surface area. The physician must decide which value applies to   the patient. The MDRD study equation should only be used in   individuals age 25 or older.  It has not been validated for the   following: pregnant women, patients with serious comorbid conditions,   or on certain medications, or persons with extremes of body size, muscle mass, or nutritional status.  Calcium 02/07/2019 8.8  8.5 - 10.1 MG/DL Final    Bilirubin, total 02/07/2019 0.6  0.2 - 1.0 MG/DL Final    ALT (SGPT) 02/07/2019 32  13 - 56 U/L Final    AST (SGOT) 02/07/2019 17  15 - 37 U/L Final    Alk. phosphatase 02/07/2019 72  45 - 117 U/L Final    Protein, total 02/07/2019 6.7  6.4 - 8.2 g/dL Final    Albumin 02/07/2019 3.8  3.4 - 5.0 g/dL Final    Globulin 02/07/2019 2.9  2.0 - 4.0 g/dL Final    A-G Ratio 02/07/2019 1.3  0.8 - 1.7   Final       .  Results for orders placed or performed during the hospital encounter of 02/07/19   XR CHEST PA LAT    Narrative    Chest PA and lateral    INDICATION: Chest congestion    COMPARISON: None    FINDINGS:  Two views of the chest were obtained. The lungs are clear. Cardiac silhouette is  normal. Pulmonary vasculature is unremarkable. Osseous structures are intact. Impression    IMPRESSION:  No acute cardiopulmonary disease. Results for orders placed or performed during the hospital encounter of 02/07/19   CBC WITH AUTOMATED DIFF   Result Value Ref Range    WBC 14.1 (H) 4.6 - 13.2 K/uL    RBC 3.76 (L) 4.20 - 5.30 M/uL    HGB 12.5 12.0 - 16.0 g/dL    HCT 36.7 35.0 - 45.0 %    MCV 97.6 (H) 74.0 - 97.0 FL    MCH 33.2 24.0 - 34.0 PG    MCHC 34.1 31.0 - 37.0 g/dL    RDW 12.0 11.6 - 14.5 %    PLATELET 624 712 - 064 K/uL    MPV 9.8 9.2 - 11.8 FL    NEUTROPHILS 73 40 - 73 %    LYMPHOCYTES 22 21 - 52 %    MONOCYTES 5 3 - 10 %    EOSINOPHILS 0 0 - 5 %    BASOPHILS 0 0 - 2 %    ABS. NEUTROPHILS 10.1 (H) 1.8 - 8.0 K/UL    ABS. LYMPHOCYTES 3.2 0.9 - 3.6 K/UL    ABS. MONOCYTES 0.8 0.05 - 1.2 K/UL    ABS. EOSINOPHILS 0.0 0.0 - 0.4 K/UL    ABS.  BASOPHILS 0.0 0.0 - 0.1 K/UL    DF AUTOMATED     METABOLIC PANEL, COMPREHENSIVE   Result Value Ref Range    Sodium 141 136 - 145 mmol/L    Potassium 3.6 3.5 - 5.5 mmol/L    Chloride 106 100 - 108 mmol/L    CO2 28 21 - 32 mmol/L    Anion gap 7 3.0 - 18 mmol/L    Glucose 77 74 - 99 mg/dL    BUN 12 7.0 - 18 MG/DL    Creatinine 0.63 0.6 - 1.3 MG/DL    BUN/Creatinine ratio 19 12 - 20      GFR est AA >60 >60 ml/min/1.73m2    GFR est non-AA >60 >60 ml/min/1.73m2    Calcium 8.8 8.5 - 10.1 MG/DL    Bilirubin, total 0.6 0.2 - 1.0 MG/DL    ALT (SGPT) 32 13 - 56 U/L    AST (SGOT) 17 15 - 37 U/L    Alk. phosphatase 72 45 - 117 U/L    Protein, total 6.7 6.4 - 8.2 g/dL    Albumin 3.8 3.4 - 5.0 g/dL    Globulin 2.9 2.0 - 4.0 g/dL    A-G Ratio 1.3 0.8 - 1.7         Assessment / Plan      ICD-10-CM ICD-9-CM    1. Bronchitis J40 490 XR CHEST PA LAT      CBC WITH AUTOMATED DIFF      METABOLIC PANEL, COMPREHENSIVE   2. ADD (attention deficit disorder) without hyperactivity F98.8 314.00 amphetamine-dextroamphetamine XR (ADDERALL XR) 20 mg XR capsule      amphetamine-dextroamphetamine XR (ADDERALL XR) 20 mg XR capsule       Biaxin  Prednisone  Tessalon  she was advised to continue her maintenance medications  she is to call if symptoms persist over five days      Follow-up Disposition:  Return in about 5 months (around 7/7/2019). I asked Patricia Chacon if she has any questions and I answered the questions.   Patricia Oswaldo states that she understands the treatment plan and agrees with the treatment plan

## 2019-02-21 ENCOUNTER — TELEPHONE (OUTPATIENT)
Dept: INTERNAL MEDICINE CLINIC | Age: 39
End: 2019-02-21

## 2019-02-21 RX ORDER — FLUCONAZOLE 150 MG/1
TABLET ORAL
Qty: 2 TAB | Refills: 0 | Status: SHIPPED | OUTPATIENT
Start: 2019-02-21 | End: 2021-07-06 | Stop reason: ALTCHOICE

## 2019-02-21 NOTE — TELEPHONE ENCOUNTER
Left message on patients voicemail to call office    Calling to see if I may help with any questions patient may have about antibiotic

## 2019-02-21 NOTE — TELEPHONE ENCOUNTER
Patient called and asking for  Dr Karlo Obrien to call her about the antibiotic that was given to her [phone]

## 2019-04-08 DIAGNOSIS — F98.8 ADD (ATTENTION DEFICIT DISORDER) WITHOUT HYPERACTIVITY: ICD-10-CM

## 2019-04-08 RX ORDER — DEXTROAMPHETAMINE SACCHARATE, AMPHETAMINE ASPARTATE MONOHYDRATE, DEXTROAMPHETAMINE SULFATE AND AMPHETAMINE SULFATE 5; 5; 5; 5 MG/1; MG/1; MG/1; MG/1
CAPSULE, EXTENDED RELEASE ORAL
Qty: 60 CAP | Refills: 0 | Status: SHIPPED | OUTPATIENT
Start: 2019-04-08 | End: 2019-05-06 | Stop reason: SDUPTHER

## 2019-05-06 DIAGNOSIS — F98.8 ADD (ATTENTION DEFICIT DISORDER) WITHOUT HYPERACTIVITY: ICD-10-CM

## 2019-05-07 RX ORDER — DEXTROAMPHETAMINE SACCHARATE, AMPHETAMINE ASPARTATE MONOHYDRATE, DEXTROAMPHETAMINE SULFATE AND AMPHETAMINE SULFATE 5; 5; 5; 5 MG/1; MG/1; MG/1; MG/1
CAPSULE, EXTENDED RELEASE ORAL
Qty: 60 CAP | Refills: 0 | Status: SHIPPED | OUTPATIENT
Start: 2019-05-07 | End: 2019-05-08 | Stop reason: SDUPTHER

## 2019-05-08 DIAGNOSIS — F98.8 ADD (ATTENTION DEFICIT DISORDER) WITHOUT HYPERACTIVITY: ICD-10-CM

## 2019-05-08 RX ORDER — DEXTROAMPHETAMINE SACCHARATE, AMPHETAMINE ASPARTATE MONOHYDRATE, DEXTROAMPHETAMINE SULFATE AND AMPHETAMINE SULFATE 5; 5; 5; 5 MG/1; MG/1; MG/1; MG/1
CAPSULE, EXTENDED RELEASE ORAL
Qty: 60 CAP | Refills: 0 | Status: SHIPPED | OUTPATIENT
Start: 2019-05-08 | End: 2019-05-31 | Stop reason: ALTCHOICE

## 2019-05-08 NOTE — TELEPHONE ENCOUNTER
Patient under [facility] will not print to airline if patient hasn't been seen in the new office.

## 2019-05-31 ENCOUNTER — OFFICE VISIT (OUTPATIENT)
Dept: FAMILY MEDICINE CLINIC | Facility: CLINIC | Age: 39
End: 2019-05-31

## 2019-05-31 VITALS
HEART RATE: 77 BPM | OXYGEN SATURATION: 99 % | RESPIRATION RATE: 20 BRPM | DIASTOLIC BLOOD PRESSURE: 84 MMHG | SYSTOLIC BLOOD PRESSURE: 120 MMHG | BODY MASS INDEX: 23.66 KG/M2 | WEIGHT: 142 LBS | HEIGHT: 65 IN | TEMPERATURE: 97.8 F

## 2019-05-31 DIAGNOSIS — F98.8 ATTENTION DEFICIT DISORDER (ADD) WITHOUT HYPERACTIVITY: Primary | ICD-10-CM

## 2019-05-31 DIAGNOSIS — R63.5 WEIGHT GAIN: ICD-10-CM

## 2019-05-31 DIAGNOSIS — I73.00 RAYNAUD'S PHENOMENON WITHOUT GANGRENE: ICD-10-CM

## 2019-05-31 RX ORDER — METHYLPHENIDATE HYDROCHLORIDE 27 MG/1
TABLET ORAL
Qty: 30 TAB | Refills: 0 | Status: SHIPPED | OUTPATIENT
Start: 2019-05-31 | End: 2021-07-06 | Stop reason: ALTCHOICE

## 2019-05-31 NOTE — PROGRESS NOTES
Chief Complaint   Patient presents with    Attention Deficit Disorder     pt states \" that the Adderall medication is not working\"    Medication Evaluation

## 2019-06-02 NOTE — PROGRESS NOTES
The patient presents to the office today with the chief complaint of ADD    HPI    The patient remains on Adderall for ADD. She is doing fairly well but she feels that the Adderall is not as effective as before. The patient notes intermittent vasospasm with blanching and acrocyanosis of one finger and a toe. Although not severe the problem is slowly worsening. The patient is distressed over her increasing weight despite watching her diet. Review of Systems   Respiratory: Negative for shortness of breath. Cardiovascular: Negative for chest pain, palpitations and leg swelling. Allergies   Allergen Reactions    Buspar [Buspirone] Anaphylaxis and Other (comments)     Dizziness  Lightheadedness       Current Outpatient Medications   Medication Sig Dispense Refill    methyphenidate ER 27 mg 24 hr tab 1 tab each AM 30 Tab 0    amphetamine-dextroamphetamine XR (ADDERALL XR) 20 mg XR capsule 1 tablet twice per day 60 Cap 0    acyclovir (ZOVIRAX) 400 mg tablet Take 1 Tab by mouth every four (4) hours (while awake). 60 Tab 0    fluconazole (DIFLUCAN) 150 mg tablet TK 1 T PO 1 TIME FOR YEAST INFECTION.  MAY REPEAT DOSE IN 3 TO 5 DAYS IF SYMPTOMS PERSISTS 2 Tab 0       Past Medical History:   Diagnosis Date    Acute reaction to stress     ADD (attention deficit disorder) without hyperactivity     Depressive disorder     GERD (gastroesophageal reflux disease)     Influenza     Low back pain     Sciatica     Sinusitis     Spasm of muscle     Strain of knee        Past Surgical History:   Procedure Laterality Date    HX TONSILLECTOMY      HX WISDOM TEETH EXTRACTION         Social History     Socioeconomic History    Marital status:      Spouse name: Not on file    Number of children: Not on file    Years of education: Not on file    Highest education level: Not on file   Occupational History    Not on file   Social Needs    Financial resource strain: Not on file   [place] insecurity:     Worry: Not on file     Inability: Not on file    Transportation needs:     Medical: Not on file     Non-medical: Not on file   Tobacco Use    Smoking status: Never Smoker    Smokeless tobacco: Never Used   Substance and Sexual Activity    Alcohol use: Yes     Comment: socially    Drug use: No    Sexual activity: Yes   Lifestyle    Physical activity:     Days per week: Not on file     Minutes per session: Not on file    Stress: Not on file   Relationships    Social connections:     Talks on phone: Not on file     Gets together: Not on file     Attends Christianity service: Not on file     Active member of club or organization: Not on file     Attends meetings of clubs or organizations: Not on file     Relationship status: Not on file    Intimate partner violence:     Fear of current or ex partner: Not on file     Emotionally abused: Not on file     Physically abused: Not on file     Forced sexual activity: Not on file   Other Topics Concern    Not on file   Social History Narrative    Not on file       Patient does not have an advanced directive on file    Visit Vitals  /84   Pulse 77   Temp 97.8 °F (36.6 °C) (Tympanic)   Resp 20   Ht 5' 5\" (1.651 m)   Wt 142 lb (64.4 kg)   SpO2 99%   BMI 23.63 kg/m²       Physical Exam  No Cervical Lymphadenopathy  No Supraclavicular Lymphadenopathy  Thyroid is Normal  Lungs are normal to percussion. Clear to auscultation   Heart:  S1 S2 are normal, No gallops, No murmurs  No Carotid Bruits  Abdomen:  Normal Bowel Sounds. No tenderness. No masses. No Hepatomegaly or Splenomegaly  LE:  Strong Pedal Pulses. No Edema      BMI:  OK    No visits with results within 3 Month(s) from this visit.    Latest known visit with results is:   Hospital Outpatient Visit on 02/07/2019   Component Date Value Ref Range Status    WBC 02/07/2019 14.1* 4.6 - 13.2 K/uL Final    RBC 02/07/2019 3.76* 4.20 - 5.30 M/uL Final    HGB 02/07/2019 12.5  12.0 - 16.0 g/dL Final  HCT 02/07/2019 36.7  35.0 - 45.0 % Final    MCV 02/07/2019 97.6* 74.0 - 97.0 FL Final    MCH 02/07/2019 33.2  24.0 - 34.0 PG Final    MCHC 02/07/2019 34.1  31.0 - 37.0 g/dL Final    RDW 02/07/2019 12.0  11.6 - 14.5 % Final    PLATELET 36/04/0276 655  135 - 420 K/uL Final    MPV 02/07/2019 9.8  9.2 - 11.8 FL Final    NEUTROPHILS 02/07/2019 73  40 - 73 % Final    LYMPHOCYTES 02/07/2019 22  21 - 52 % Final    MONOCYTES 02/07/2019 5  3 - 10 % Final    EOSINOPHILS 02/07/2019 0  0 - 5 % Final    BASOPHILS 02/07/2019 0  0 - 2 % Final    ABS. NEUTROPHILS 02/07/2019 10.1* 1.8 - 8.0 K/UL Final    ABS. LYMPHOCYTES 02/07/2019 3.2  0.9 - 3.6 K/UL Final    ABS. MONOCYTES 02/07/2019 0.8  0.05 - 1.2 K/UL Final    ABS. EOSINOPHILS 02/07/2019 0.0  0.0 - 0.4 K/UL Final    ABS. BASOPHILS 02/07/2019 0.0  0.0 - 0.1 K/UL Final    DF 02/07/2019 AUTOMATED    Final    Sodium 02/07/2019 141  136 - 145 mmol/L Final    Potassium 02/07/2019 3.6  3.5 - 5.5 mmol/L Final    Chloride 02/07/2019 106  100 - 108 mmol/L Final    CO2 02/07/2019 28  21 - 32 mmol/L Final    Anion gap 02/07/2019 7  3.0 - 18 mmol/L Final    Glucose 02/07/2019 77  74 - 99 mg/dL Final    BUN 02/07/2019 12  7.0 - 18 MG/DL Final    Creatinine 02/07/2019 0.63  0.6 - 1.3 MG/DL Final    BUN/Creatinine ratio 02/07/2019 19  12 - 20   Final    GFR est AA 02/07/2019 >60  >60 ml/min/1.73m2 Final    GFR est non-AA 02/07/2019 >60  >60 ml/min/1.73m2 Final    Comment: (NOTE)  Estimated GFR is calculated using the Modification of Diet in Renal   Disease (MDRD) Study equation, reported for both  Americans   (GFRAA) and non- Americans (GFRNA), and normalized to 1.73m2   body surface area. The physician must decide which value applies to   the patient. The MDRD study equation should only be used in   individuals age 25 or older.  It has not been validated for the   following: pregnant women, patients with serious comorbid conditions,   or on certain medications, or persons with extremes of body size,   muscle mass, or nutritional status.  Calcium 02/07/2019 8.8  8.5 - 10.1 MG/DL Final    Bilirubin, total 02/07/2019 0.6  0.2 - 1.0 MG/DL Final    ALT (SGPT) 02/07/2019 32  13 - 56 U/L Final    AST (SGOT) 02/07/2019 17  15 - 37 U/L Final    Alk. phosphatase 02/07/2019 72  45 - 117 U/L Final    Protein, total 02/07/2019 6.7  6.4 - 8.2 g/dL Final    Albumin 02/07/2019 3.8  3.4 - 5.0 g/dL Final    Globulin 02/07/2019 2.9  2.0 - 4.0 g/dL Final    A-G Ratio 02/07/2019 1.3  0.8 - 1.7   Final       .No results found for any visits on 05/31/19. Assessment / Plan      ICD-10-CM ICD-9-CM    1. Attention deficit disorder (ADD) without hyperactivity F98.8 314.00 methyphenidate ER 27 mg 24 hr tab      CBC WITH AUTOMATED DIFF      METABOLIC PANEL, COMPREHENSIVE   2. Weight gain R63.5 783.1 CBC WITH AUTOMATED DIFF      METABOLIC PANEL, COMPREHENSIVE      TSH 3RD GENERATION   3. Raynaud's phenomenon without gangrene I73.00 443.0 CBC WITH AUTOMATED DIFF      METABOLIC PANEL, COMPREHENSIVE      ANTINUCLEAR ANTIBODIES, IFA     Labs ordered  Change Adderall to Concerta  she was advised to continue her other maintenance medications  Follow symptoms of Raynauds      Follow-up and Dispositions    · Return in about 3 months (around 8/31/2019). I asked Momo Roy if she has any questions and I answered the questions. Momo Roy states that she understands the treatment plan and agrees with the treatment plan      THIS DOCUMENT WAS CREATED WITH A VOICE ACTIVATED DICTATION SYSTEM.   IT MAY CONTAIN TRANSCRIPTION ERRORS

## 2019-06-26 DIAGNOSIS — F98.8 ADD (ATTENTION DEFICIT DISORDER) WITHOUT HYPERACTIVITY: ICD-10-CM

## 2019-06-27 RX ORDER — DEXTROAMPHETAMINE SACCHARATE, AMPHETAMINE ASPARTATE MONOHYDRATE, DEXTROAMPHETAMINE SULFATE AND AMPHETAMINE SULFATE 5; 5; 5; 5 MG/1; MG/1; MG/1; MG/1
CAPSULE, EXTENDED RELEASE ORAL
Qty: 60 CAP | Refills: 0 | Status: SHIPPED | OUTPATIENT
Start: 2019-06-27 | End: 2019-07-10 | Stop reason: SDUPTHER

## 2019-06-27 NOTE — TELEPHONE ENCOUNTER
Controlled Substance Refill Request Information    Medication: Adderall XR 20 mg   (note: Methylphenidate ER 27 mg prescribed on 5/31/2019 was NOT covered by insurance per patient - not filled on )    Last fill on : Adderall XR #60 on 5/13/2019     Last OV: 5/31/2019    Next appointment: none scheduled    Last UDS: none    Controlled Substance Agreement on File: No

## 2019-07-03 ENCOUNTER — TELEPHONE (OUTPATIENT)
Dept: FAMILY MEDICINE CLINIC | Facility: CLINIC | Age: 39
End: 2019-07-03

## 2019-07-03 NOTE — TELEPHONE ENCOUNTER
Patient states she came in last week to  her adderall prescription that was printed on 6/27 but was not found. She called today and it has not been re-printed. Please advise.

## 2019-07-10 ENCOUNTER — HOSPITAL ENCOUNTER (OUTPATIENT)
Dept: LAB | Age: 39
Discharge: HOME | End: 2019-07-10
Payer: COMMERCIAL

## 2019-07-10 DIAGNOSIS — F98.8 ADD (ATTENTION DEFICIT DISORDER) WITHOUT HYPERACTIVITY: ICD-10-CM

## 2019-07-10 DIAGNOSIS — R63.5 WEIGHT GAIN: ICD-10-CM

## 2019-07-10 DIAGNOSIS — I73.00 RAYNAUD'S PHENOMENON WITHOUT GANGRENE: ICD-10-CM

## 2019-07-10 DIAGNOSIS — F98.8 ATTENTION DEFICIT DISORDER (ADD) WITHOUT HYPERACTIVITY: ICD-10-CM

## 2019-07-10 LAB
ALBUMIN SERPL-MCNC: 4.2 G/DL (ref 3.4–5)
ALBUMIN/GLOB SERPL: 1.4 {RATIO} (ref 0.8–1.7)
ALP SERPL-CCNC: 68 U/L (ref 45–117)
ALT SERPL-CCNC: 29 U/L (ref 13–56)
ANION GAP SERPL CALC-SCNC: 7 MMOL/L (ref 3–18)
AST SERPL-CCNC: 21 U/L (ref 15–37)
BASOPHILS # BLD: 0 K/UL (ref 0–0.1)
BASOPHILS NFR BLD: 0 % (ref 0–2)
BILIRUB SERPL-MCNC: 0.6 MG/DL (ref 0.2–1)
BUN SERPL-MCNC: 13 MG/DL (ref 7–18)
BUN/CREAT SERPL: 19 (ref 12–20)
CALCIUM SERPL-MCNC: 9.3 MG/DL (ref 8.5–10.1)
CHLORIDE SERPL-SCNC: 101 MMOL/L (ref 100–108)
CO2 SERPL-SCNC: 28 MMOL/L (ref 21–32)
CREAT SERPL-MCNC: 0.68 MG/DL (ref 0.6–1.3)
DIFFERENTIAL METHOD BLD: ABNORMAL
EOSINOPHIL # BLD: 0.1 K/UL (ref 0–0.4)
EOSINOPHIL NFR BLD: 1 % (ref 0–5)
ERYTHROCYTE [DISTWIDTH] IN BLOOD BY AUTOMATED COUNT: 12.2 % (ref 11.6–14.5)
GLOBULIN SER CALC-MCNC: 2.9 G/DL (ref 2–4)
GLUCOSE SERPL-MCNC: 82 MG/DL (ref 74–99)
HCT VFR BLD AUTO: 42.9 % (ref 35–45)
HGB BLD-MCNC: 13.8 G/DL (ref 12–16)
LYMPHOCYTES # BLD: 2.1 K/UL (ref 0.9–3.6)
LYMPHOCYTES NFR BLD: 24 % (ref 21–52)
MCH RBC QN AUTO: 32.9 PG (ref 24–34)
MCHC RBC AUTO-ENTMCNC: 32.2 G/DL (ref 31–37)
MCV RBC AUTO: 102.4 FL (ref 74–97)
MONOCYTES # BLD: 0.5 K/UL (ref 0.05–1.2)
MONOCYTES NFR BLD: 5 % (ref 3–10)
NEUTS SEG # BLD: 6.2 K/UL (ref 1.8–8)
NEUTS SEG NFR BLD: 70 % (ref 40–73)
PLATELET # BLD AUTO: 291 K/UL (ref 135–420)
PMV BLD AUTO: 10.3 FL (ref 9.2–11.8)
POTASSIUM SERPL-SCNC: 4.3 MMOL/L (ref 3.5–5.5)
PROT SERPL-MCNC: 7.1 G/DL (ref 6.4–8.2)
RBC # BLD AUTO: 4.19 M/UL (ref 4.2–5.3)
SODIUM SERPL-SCNC: 136 MMOL/L (ref 136–145)
TSH SERPL DL<=0.05 MIU/L-ACNC: 1.37 UIU/ML (ref 0.36–3.74)
WBC # BLD AUTO: 8.9 K/UL (ref 4.6–13.2)

## 2019-07-10 PROCEDURE — 80053 COMPREHEN METABOLIC PANEL: CPT

## 2019-07-10 PROCEDURE — 36415 COLL VENOUS BLD VENIPUNCTURE: CPT

## 2019-07-10 PROCEDURE — 86038 ANTINUCLEAR ANTIBODIES: CPT

## 2019-07-10 PROCEDURE — 84443 ASSAY THYROID STIM HORMONE: CPT

## 2019-07-10 PROCEDURE — 85025 COMPLETE CBC W/AUTO DIFF WBC: CPT

## 2019-07-10 RX ORDER — DEXTROAMPHETAMINE SACCHARATE, AMPHETAMINE ASPARTATE MONOHYDRATE, DEXTROAMPHETAMINE SULFATE AND AMPHETAMINE SULFATE 5; 5; 5; 5 MG/1; MG/1; MG/1; MG/1
CAPSULE, EXTENDED RELEASE ORAL
Qty: 60 CAP | Refills: 0 | Status: SHIPPED | OUTPATIENT
Start: 2019-07-10 | End: 2019-08-23 | Stop reason: SDUPTHER

## 2019-07-14 LAB — ANA TITR SER IF: NEGATIVE {TITER}

## 2019-08-21 DIAGNOSIS — F98.8 ADD (ATTENTION DEFICIT DISORDER) WITHOUT HYPERACTIVITY: ICD-10-CM

## 2019-08-21 RX ORDER — DEXTROAMPHETAMINE SACCHARATE, AMPHETAMINE ASPARTATE MONOHYDRATE, DEXTROAMPHETAMINE SULFATE AND AMPHETAMINE SULFATE 5; 5; 5; 5 MG/1; MG/1; MG/1; MG/1
CAPSULE, EXTENDED RELEASE ORAL
Qty: 60 CAP | Refills: 0 | Status: CANCELLED | OUTPATIENT
Start: 2019-08-21

## 2019-08-21 NOTE — TELEPHONE ENCOUNTER
Requested Prescriptions     Pending Prescriptions Disp Refills    amphetamine-dextroamphetamine XR (ADDERALL XR) 20 mg XR capsule 60 Cap 0     Si tablet twice per day

## 2019-08-23 DIAGNOSIS — F98.8 ADD (ATTENTION DEFICIT DISORDER) WITHOUT HYPERACTIVITY: ICD-10-CM

## 2019-08-23 RX ORDER — DEXTROAMPHETAMINE SACCHARATE, AMPHETAMINE ASPARTATE MONOHYDRATE, DEXTROAMPHETAMINE SULFATE AND AMPHETAMINE SULFATE 5; 5; 5; 5 MG/1; MG/1; MG/1; MG/1
CAPSULE, EXTENDED RELEASE ORAL
Qty: 60 CAP | Refills: 0 | Status: SHIPPED | OUTPATIENT
Start: 2019-08-23 | End: 2019-10-07 | Stop reason: SDUPTHER

## 2019-10-07 DIAGNOSIS — F98.8 ADD (ATTENTION DEFICIT DISORDER) WITHOUT HYPERACTIVITY: ICD-10-CM

## 2019-10-09 RX ORDER — DEXTROAMPHETAMINE SACCHARATE, AMPHETAMINE ASPARTATE MONOHYDRATE, DEXTROAMPHETAMINE SULFATE AND AMPHETAMINE SULFATE 5; 5; 5; 5 MG/1; MG/1; MG/1; MG/1
CAPSULE, EXTENDED RELEASE ORAL
Qty: 60 CAP | Refills: 0 | Status: SHIPPED | OUTPATIENT
Start: 2019-10-09 | End: 2021-07-06 | Stop reason: ALTCHOICE

## 2020-01-06 DIAGNOSIS — F98.8 ADD (ATTENTION DEFICIT DISORDER) WITHOUT HYPERACTIVITY: ICD-10-CM

## 2020-01-08 RX ORDER — DEXTROAMPHETAMINE SACCHARATE, AMPHETAMINE ASPARTATE MONOHYDRATE, DEXTROAMPHETAMINE SULFATE AND AMPHETAMINE SULFATE 5; 5; 5; 5 MG/1; MG/1; MG/1; MG/1
CAPSULE, EXTENDED RELEASE ORAL
Qty: 60 CAP | Refills: 0 | OUTPATIENT
Start: 2020-01-08

## 2020-01-08 NOTE — TELEPHONE ENCOUNTER
I called the patient and left a message Per NP Pelon  to call the office and make an appointment with a   provider to get her refills since Dr. Shane Samuel has retired.

## 2021-06-04 DIAGNOSIS — Z00.00 WELL ADULT EXAM: ICD-10-CM

## 2021-06-07 RX ORDER — ACYCLOVIR 400 MG/1
400 TABLET ORAL
Qty: 60 TABLET | Refills: 0 | Status: SHIPPED | OUTPATIENT
Start: 2021-06-07 | End: 2021-06-29 | Stop reason: SDUPTHER

## 2021-06-29 DIAGNOSIS — Z00.00 WELL ADULT EXAM: ICD-10-CM

## 2021-06-29 RX ORDER — ACYCLOVIR 400 MG/1
400 TABLET ORAL
Qty: 60 TABLET | Refills: 2 | Status: SHIPPED | OUTPATIENT
Start: 2021-06-29

## 2021-07-06 ENCOUNTER — OFFICE VISIT (OUTPATIENT)
Dept: FAMILY MEDICINE CLINIC | Age: 41
End: 2021-07-06
Payer: COMMERCIAL

## 2021-07-06 VITALS
DIASTOLIC BLOOD PRESSURE: 78 MMHG | BODY MASS INDEX: 23.46 KG/M2 | TEMPERATURE: 97.4 F | HEART RATE: 75 BPM | RESPIRATION RATE: 16 BRPM | SYSTOLIC BLOOD PRESSURE: 123 MMHG | HEIGHT: 66 IN | WEIGHT: 146 LBS | OXYGEN SATURATION: 96 %

## 2021-07-06 DIAGNOSIS — F32.A DEPRESSIVE DISORDER: ICD-10-CM

## 2021-07-06 DIAGNOSIS — Z13.6 ENCOUNTER FOR LIPID SCREENING FOR CARDIOVASCULAR DISEASE: ICD-10-CM

## 2021-07-06 DIAGNOSIS — F98.8 ADD (ATTENTION DEFICIT DISORDER) WITHOUT HYPERACTIVITY: Primary | ICD-10-CM

## 2021-07-06 DIAGNOSIS — Z11.59 ENCOUNTER FOR HEPATITIS C SCREENING TEST FOR LOW RISK PATIENT: ICD-10-CM

## 2021-07-06 DIAGNOSIS — Z13.220 ENCOUNTER FOR LIPID SCREENING FOR CARDIOVASCULAR DISEASE: ICD-10-CM

## 2021-07-06 PROCEDURE — 99204 OFFICE O/P NEW MOD 45 MIN: CPT | Performed by: NURSE PRACTITIONER

## 2021-07-06 RX ORDER — DEXTROAMPHETAMINE SACCHARATE, AMPHETAMINE ASPARTATE MONOHYDRATE, DEXTROAMPHETAMINE SULFATE AND AMPHETAMINE SULFATE 2.5; 2.5; 2.5; 2.5 MG/1; MG/1; MG/1; MG/1
10 CAPSULE, EXTENDED RELEASE ORAL
Qty: 60 CAPSULE | Refills: 0 | Status: SHIPPED | OUTPATIENT
Start: 2021-07-06 | End: 2021-10-06

## 2021-07-06 RX ORDER — FLUOXETINE HYDROCHLORIDE 40 MG/1
40 CAPSULE ORAL DAILY
COMMUNITY
Start: 2021-04-28 | End: 2021-07-06 | Stop reason: SDUPTHER

## 2021-07-06 RX ORDER — FLUOXETINE HYDROCHLORIDE 40 MG/1
40 CAPSULE ORAL DAILY
Qty: 90 CAPSULE | Refills: 0 | Status: SHIPPED | OUTPATIENT
Start: 2021-07-06 | End: 2021-11-28

## 2021-07-06 NOTE — PROGRESS NOTES
Adriane Tolentino is a 47 y.o. female presenting today for New Patient (establish care Former pt Dr. Josseline Wall)  . Chief Complaint   Patient presents with    New Patient     establish care Former pt Dr. Josseline Wall       HPI:  Adriane Tolentino presents to the office today to establish care with a new provider. She is a prior patient of Dr. Natacha Lerner and carries a medical diagnosis for HSV 1, ADD and depression. Patient was previously seeing another provider but would like to transition to this practice. Depression-currently prescribed fluoxetine 40 mg tablets daily. She notes her depression has improved with this medication treatment plan. She denies any suicidal ideations. ADD-patient has stopped taking her Adderall secondary to not liking how she felt \"coming off the medication\". Notes when she was coming out the medication she felt depressed and angry. This was prior to her starting fluoxetine daily. Patient notes that since starting the fluoxetine she feels her depression is controlled but notes that she is lagging on her job. She has multiple tasks she has not been able to complete because she is unable to remain focused. ROS  ROS:  History obtained from the patient intake forms which are reviewed with the patient  · General: negative for - chills, fever, weight changes or malaise  · HEENT: no sore throat, nasal congestion, vision problems or ear problems  · Respiratory: no cough, shortness of breath, or wheezing  · Cardiovascular: no chest pain, palpitations, or dyspnea on exertion  · Gastrointestinal: no abdominal pain, N/V, change in bowel habits, or black or bloody stools  · Musculoskeletal: no back pain, joint pain, joint stiffness, muscle pain or muscle weakness  · Neurological: no numbness, tingling, headache or dizziness  · Endo:  No polyuria or polydipsia. · : no hematuria, dysuria, frequency, hesitancy, or nocturia.     · Psychological: negative for - anxiety, depression, sleep disturbances, suicidal or homicidal ideations    Allergies   Allergen Reactions    Buspar [Buspirone] Anaphylaxis and Other (comments)     Dizziness  Lightheadedness       PHQ Screening   No flowsheet data found. History  Past Medical History:   Diagnosis Date    Acute reaction to stress     ADD (attention deficit disorder) without hyperactivity     Depressive disorder     GERD (gastroesophageal reflux disease)     Influenza     Low back pain     Sciatica     Sinusitis     Spasm of muscle     Strain of knee        Past Surgical History:   Procedure Laterality Date    HX TONSILLECTOMY      HX WISDOM TEETH EXTRACTION         Social History     Socioeconomic History    Marital status:      Spouse name: Not on file    Number of children: Not on file    Years of education: Not on file    Highest education level: Not on file   Occupational History    Not on file   Tobacco Use    Smoking status: Never Smoker    Smokeless tobacco: Never Used   Substance and Sexual Activity    Alcohol use: Yes     Comment: socially    Drug use: No    Sexual activity: Yes   Other Topics Concern    Not on file   Social History Narrative    Not on file     Social Determinants of Health     Financial Resource Strain:     Difficulty of Paying Living Expenses:    Food Insecurity:     Worried About Running Out of Food in the Last Year:     Ran Out of Food in the Last Year:    Transportation Needs:     Lack of Transportation (Medical):      Lack of Transportation (Non-Medical):    Physical Activity:     Days of Exercise per Week:     Minutes of Exercise per Session:    Stress:     Feeling of Stress :    Social Connections:     Frequency of Communication with Friends and Family:     Frequency of Social Gatherings with Friends and Family:     Attends Bahai Services:     Active Member of Clubs or Organizations:     Attends Club or Organization Meetings:     Marital Status:    Intimate Partner Violence:     Fear of Current or Ex-Partner:     Emotionally Abused:     Physically Abused:     Sexually Abused:        Current Outpatient Medications   Medication Sig Dispense Refill    FLUoxetine (PROzac) 40 mg capsule Take 1 Capsule by mouth daily. 90 Capsule 0    amphetamine-dextroamphetamine XR (ADDERALL XR) 10 mg XR capsule Take 1 Capsule by mouth every morning. Max Daily Amount: 10 mg. 60 Capsule 0    acyclovir (ZOVIRAX) 400 mg tablet Take 1 Tablet by mouth every four (4) hours (while awake). 60 Tablet 2         Vitals:    07/06/21 1525   BP: 123/78   Pulse: 75   Resp: 16   Temp: 97.4 °F (36.3 °C)   TempSrc: Oral   SpO2: 96%   Weight: 146 lb (66.2 kg)   Height: 5' 6\" (1.676 m)   PainSc:   0 - No pain       Physical Exam  Vitals and nursing note reviewed. Constitutional:       Appearance: Normal appearance. Cardiovascular:      Rate and Rhythm: Normal rate and regular rhythm. Pulses: Normal pulses. Heart sounds: Normal heart sounds. Pulmonary:      Effort: Pulmonary effort is normal.      Breath sounds: Normal breath sounds. Abdominal:      General: Bowel sounds are normal.      Palpations: Abdomen is soft. Skin:     General: Skin is warm and dry. Neurological:      Mental Status: She is alert. Psychiatric:         Mood and Affect: Mood normal.         No visits with results within 3 Month(s) from this visit.    Latest known visit with results is:   Hospital Outpatient Visit on 07/10/2019   Component Date Value Ref Range Status    WBC 07/10/2019 8.9  4.6 - 13.2 K/uL Final    RBC 07/10/2019 4.19* 4.20 - 5.30 M/uL Final    HGB 07/10/2019 13.8  12.0 - 16.0 g/dL Final    HCT 07/10/2019 42.9  35.0 - 45.0 % Final    MCV 07/10/2019 102.4* 74.0 - 97.0 FL Final    MCH 07/10/2019 32.9  24.0 - 34.0 PG Final    MCHC 07/10/2019 32.2  31.0 - 37.0 g/dL Final    RDW 07/10/2019 12.2  11.6 - 14.5 % Final    PLATELET 13/47/2003 906  135 - 420 K/uL Final    MPV 07/10/2019 10.3  9.2 - 11.8 FL Final    NEUTROPHILS 07/10/2019 70  40 - 73 % Final    LYMPHOCYTES 07/10/2019 24  21 - 52 % Final    MONOCYTES 07/10/2019 5  3 - 10 % Final    EOSINOPHILS 07/10/2019 1  0 - 5 % Final    BASOPHILS 07/10/2019 0  0 - 2 % Final    ABS. NEUTROPHILS 07/10/2019 6.2  1.8 - 8.0 K/UL Final    ABS. LYMPHOCYTES 07/10/2019 2.1  0.9 - 3.6 K/UL Final    ABS. MONOCYTES 07/10/2019 0.5  0.05 - 1.2 K/UL Final    ABS. EOSINOPHILS 07/10/2019 0.1  0.0 - 0.4 K/UL Final    ABS. BASOPHILS 07/10/2019 0.0  0.0 - 0.1 K/UL Final    DF 07/10/2019 AUTOMATED    Final    Sodium 07/10/2019 136  136 - 145 mmol/L Final    Potassium 07/10/2019 4.3  3.5 - 5.5 mmol/L Final    Chloride 07/10/2019 101  100 - 108 mmol/L Final    CO2 07/10/2019 28  21 - 32 mmol/L Final    Anion gap 07/10/2019 7  3.0 - 18 mmol/L Final    Glucose 07/10/2019 82  74 - 99 mg/dL Final    BUN 07/10/2019 13  7.0 - 18 MG/DL Final    Creatinine 07/10/2019 0.68  0.6 - 1.3 MG/DL Final    BUN/Creatinine ratio 07/10/2019 19  12 - 20   Final    GFR est AA 07/10/2019 >60  >60 ml/min/1.73m2 Final    GFR est non-AA 07/10/2019 >60  >60 ml/min/1.73m2 Final    Comment: (NOTE)  Estimated GFR is calculated using the Modification of Diet in Renal   Disease (MDRD) Study equation, reported for both  Americans   (GFRAA) and non- Americans (GFRNA), and normalized to 1.73m2   body surface area. The physician must decide which value applies to   the patient. The MDRD study equation should only be used in   individuals age 25 or older. It has not been validated for the   following: pregnant women, patients with serious comorbid conditions,   or on certain medications, or persons with extremes of body size,   muscle mass, or nutritional status.  Calcium 07/10/2019 9.3  8.5 - 10.1 MG/DL Final    Bilirubin, total 07/10/2019 0.6  0.2 - 1.0 MG/DL Final    ALT (SGPT) 07/10/2019 29  13 - 56 U/L Final    AST (SGOT) 07/10/2019 21  15 - 37 U/L Final    Alk. phosphatase 07/10/2019 68  45 - 117 U/L Final    Protein, total 07/10/2019 7.1  6.4 - 8.2 g/dL Final    Albumin 07/10/2019 4.2  3.4 - 5.0 g/dL Final    Globulin 07/10/2019 2.9  2.0 - 4.0 g/dL Final    A-G Ratio 07/10/2019 1.4  0.8 - 1.7   Final    TSH 07/10/2019 1.37  0.36 - 3.74 uIU/mL Final    Antinuclear Abs, IFA 07/10/2019 NEGATIVE     Final    Comment: (NOTE)                                      Negative   <1:80                                      Borderline  1:80                                      Positive   >1:80  Performed At: [address] [id number]  Verónica Jansen MD [id number]         No results found for any visits on 07/06/21. Patient Care Team:  Patient Care Team:  Cas Pryor NP as PCP - General (Nurse Practitioner)      Assessment / Plan:      ICD-10-CM ICD-9-CM    1. ADD (attention deficit disorder) without hyperactivity  F98.8 314.00 amphetamine-dextroamphetamine XR (ADDERALL XR) 10 mg XR capsule   2. Depressive disorder  F32.9 311 FLUoxetine (PROzac) 40 mg capsule   3. Encounter for hepatitis C screening test for low risk patient  Z11.59 V73.89 HEPATITIS C AB   4. Encounter for lipid screening for cardiovascular disease  Z13.220 V77.91 LIPID PANEL    Z13.6 V81.2 CBC WITH AUTOMATED DIFF      METABOLIC PANEL, COMPREHENSIVE     Start Adderall 10 mg extended release capsule twice daily  Refill fluoxetine  Fasting labs ordered  Follow-up in 1 month        I asked the patient if she  had any questions and answered her  questions. The patient stated that she understands the treatment plan and agrees with the treatment plan    This document was created with a voice activated dictation system and may contain transcription errors.

## 2021-07-06 NOTE — PROGRESS NOTES
Melissa Elvincorrie presents today for   Chief Complaint   Patient presents with    New Patient     establish care Former pt Dr. Hill Knee       Is someone accompanying this pt? no    Is the patient using any DME equipment during OV? no    Depression Screening:  No flowsheet data found. Learning Assessment:  Learning Assessment 7/6/2021   PRIMARY LEARNER Patient   HIGHEST LEVEL OF EDUCATION - PRIMARY LEARNER  SOME COLLEGE   BARRIERS PRIMARY LEARNER NONE   PRIMARY LANGUAGE ENGLISH   LEARNER PREFERENCE PRIMARY LISTENING   ANSWERED BY patient   RELATIONSHIP SELF       Health Maintenance reviewed and discussed and ordered per Provider. Health Maintenance Due   Topic Date Due    Hepatitis C Screening  Never done    DTaP/Tdap/Td series (1 - Tdap) Never done    PAP AKA CERVICAL CYTOLOGY  Never done   . Coordination of Care:  1. Have you been to the ER, urgent care clinic since your last visit? Hospitalized since your last visit? no    2. Have you seen or consulted any other health care providers outside of the [address] [place] [address] since your last visit? Include any pap smears or colon screening.  no

## 2021-07-07 ENCOUNTER — DOCUMENTATION ONLY (OUTPATIENT)
Dept: FAMILY MEDICINE CLINIC | Age: 41
End: 2021-07-07

## 2021-10-06 ENCOUNTER — E-VISIT (OUTPATIENT)
Dept: FAMILY MEDICINE CLINIC | Age: 41
End: 2021-10-06

## 2021-10-06 ENCOUNTER — PATIENT MESSAGE (OUTPATIENT)
Dept: FAMILY MEDICINE CLINIC | Age: 41
End: 2021-10-06

## 2021-10-06 DIAGNOSIS — F98.8 ADD (ATTENTION DEFICIT DISORDER) WITHOUT HYPERACTIVITY: ICD-10-CM

## 2021-10-06 RX ORDER — DEXTROAMPHETAMINE SACCHARATE, AMPHETAMINE ASPARTATE MONOHYDRATE, DEXTROAMPHETAMINE SULFATE AND AMPHETAMINE SULFATE 2.5; 2.5; 2.5; 2.5 MG/1; MG/1; MG/1; MG/1
10 CAPSULE, EXTENDED RELEASE ORAL
Qty: 60 CAPSULE | Refills: 0 | Status: SHIPPED | OUTPATIENT
Start: 2021-10-06 | End: 2022-01-04 | Stop reason: SDUPTHER

## 2021-10-06 NOTE — PROGRESS NOTES
This patient will be notified that she will need a virtual visit for ADD/Anxiety and Depression follow-up

## 2021-10-06 NOTE — TELEPHONE ENCOUNTER
From: Annalise Davis  To: Adine Felty, NP  Sent: 10/6/2021 12:57 PM EDT  Subject: Visit Follow-Up Question    Good Afternoon. I just sent an e-visit to you to follow up from my last appointment. The ADHD meds seem to be doing ok and I dont have the let down period like I was having with the higher dose. With that being said, I do still feel like I need to have my depression medicine bumped up some. I feel like my medicine is not working as well as it used to. Also, I need a refill on the ADHD medicine.

## 2021-10-15 ENCOUNTER — VIRTUAL VISIT (OUTPATIENT)
Dept: FAMILY MEDICINE CLINIC | Age: 41
End: 2021-10-15
Payer: COMMERCIAL

## 2021-10-15 DIAGNOSIS — F32.A DEPRESSIVE DISORDER: Primary | ICD-10-CM

## 2021-10-15 PROCEDURE — 99213 OFFICE O/P EST LOW 20 MIN: CPT | Performed by: NURSE PRACTITIONER

## 2021-10-15 RX ORDER — ARIPIPRAZOLE 5 MG/1
5 TABLET ORAL DAILY
Qty: 30 TABLET | Refills: 1 | Status: SHIPPED | OUTPATIENT
Start: 2021-10-15 | End: 2022-01-04 | Stop reason: SDUPTHER

## 2021-10-15 RX ORDER — VENLAFAXINE HYDROCHLORIDE 37.5 MG/1
37.5 CAPSULE, EXTENDED RELEASE ORAL DAILY
Qty: 30 CAPSULE | Refills: 1 | Status: SHIPPED
Start: 2021-10-15 | End: 2021-10-15 | Stop reason: CLARIF

## 2021-10-15 NOTE — PROGRESS NOTES
Carlie Langley is a 46 y.o. female who was seen by synchronous (real-time) audio-video technology on 10/15/2021 for Follow-up (Pt tomasz obrien to discuss Prozac medication )        Assessment & Plan:   Diagnoses and all orders for this visit:    1. Depressive disorder  -     venlafaxine-SR (EFFEXOR-XR) 37.5 mg capsule; Take 1 Capsule by mouth daily. Will add Effexor to the treatment plan to have patient follow-up in 4 to 6 weeks. Subjective: The patient presents for an Audio-visual teleconference appointment for depression follow-up care. Patient is prescribed Prozac 40 mg capsule but reports she still does not have the energy or drive to do anything. Notes initially she feels like she wants to go out with a friend to have dinner but at the last minute changes her mind. She feels like she is just moving to life doing the same thing every day. Patient is requesting additional medication for her symptoms. Prior to Admission medications    Medication Sig Start Date End Date Taking? Authorizing Provider   venlafaxine-SR [facility]) 37.5 mg capsule Take 1 Capsule by mouth daily. 10/15/21  Yes Raghu Nick NP   amphetamine-dextroamphetamine XR (ADDERALL XR) 10 mg XR capsule Take 1 Capsule by mouth every morning. Max Daily Amount: 10 mg. 10/6/21  Yes Raghu Nick NP   FLUoxetine (PROzac) 40 mg capsule Take 1 Capsule by mouth daily. 7/6/21  Yes Raghu Nick NP   acyclovir (ZOVIRAX) 400 mg tablet Take 1 Tablet by mouth every four (4) hours (while awake).  6/29/21  Yes Raghu Nick NP     Patient Active Problem List   Diagnosis Code    Low back pain M54.50    Sinusitis J32.9    Acute upper respiratory infection J06.9    Spasm of muscle M62.838    Depressive disorder F32.9    Strain of knee S86.919A    ADD (attention deficit disorder) without hyperactivity F98.8    Sciatica M54.30    Acute reaction to stress F43.0    Influenza J11.1    Low back pain without sciatica M54.50    Lumbosacral spondylosis without myelopathy M47.817    DDD (degenerative disc disease), lumbar M51.36     Patient Active Problem List    Diagnosis Date Noted    Low back pain without sciatica 06/15/2018    Lumbosacral spondylosis without myelopathy 06/15/2018    DDD (degenerative disc disease), lumbar 06/15/2018    Low back pain     Sinusitis     Acute upper respiratory infection     Spasm of muscle     Depressive disorder     Strain of knee     ADD (attention deficit disorder) without hyperactivity     Sciatica     Acute reaction to stress     Influenza      Current Outpatient Medications   Medication Sig Dispense Refill    venlafaxine-SR (EFFEXOR-XR) 37.5 mg capsule Take 1 Capsule by mouth daily. 30 Capsule 1    amphetamine-dextroamphetamine XR (ADDERALL XR) 10 mg XR capsule Take 1 Capsule by mouth every morning. Max Daily Amount: 10 mg. 60 Capsule 0    FLUoxetine (PROzac) 40 mg capsule Take 1 Capsule by mouth daily. 90 Capsule 0    acyclovir (ZOVIRAX) 400 mg tablet Take 1 Tablet by mouth every four (4) hours (while awake).  60 Tablet 2     Allergies   Allergen Reactions    Buspar [Buspirone] Anaphylaxis and Other (comments)     Dizziness  Lightheadedness     Past Medical History:   Diagnosis Date    Acute reaction to stress     ADD (attention deficit disorder) without hyperactivity     Depressive disorder     GERD (gastroesophageal reflux disease)     Influenza     Low back pain     Sciatica     Sinusitis     Spasm of muscle     Strain of knee        ROS    ROS:  History obtained from the patient intake forms which are reviewed with the patient  · General: negative for - chills, fever, weight changes or malaise  · HEENT: no sore throat, nasal congestion, vision problems or ear problems  · Respiratory: no cough, shortness of breath, or wheezing  · Cardiovascular: no chest pain, palpitations, or dyspnea on exertion  · Gastrointestinal: no abdominal pain, N/V, change in bowel habits, or black or bloody stools  · Musculoskeletal: no back pain, joint pain, joint stiffness, muscle pain or muscle weakness  · Neurological: no numbness, tingling, headache or dizziness  · Endo:  No polyuria or polydipsia. · : no hematuria, dysuria, frequency, hesitancy, or nocturia. · Psychological: Depression    Objective:   No flowsheet data found. [INSTRUCTIONS:  \"[x]\" Indicates a positive item  \"[]\" Indicates a negative item  -- DELETE ALL ITEMS NOT EXAMINED]    Constitutional: [x] Appears well-developed and well-nourished [x] No apparent distress      [] Abnormal -     Mental status: [x] Alert and awake  [x] Oriented to person/place/time [x] Able to follow commands    [] Abnormal -     Eyes:   EOM    [x]  Normal    [] Abnormal -   Sclera  [x]  Normal    [] Abnormal -          Discharge [x]  None visible   [] Abnormal -     HENT: [x] Normocephalic, atraumatic  [] Abnormal -   [x] Mouth/Throat: Mucous membranes are moist    External Ears [x] Normal  [] Abnormal -    Neck: [x] No visualized mass [] Abnormal -     Pulmonary/Chest: [x] Respiratory effort normal   [x] No visualized signs of difficulty breathing or respiratory distress        [] Abnormal -      Musculoskeletal:   [x] Normal gait with no signs of ataxia         [x] Normal range of motion of neck        [] Abnormal -     Neurological:        [x] No Facial Asymmetry (Cranial nerve 7 motor function) (limited exam due to video visit)          [x] No gaze palsy        [] Abnormal -          Skin:        [x] No significant exanthematous lesions or discoloration noted on facial skin         [] Abnormal -            Psychiatric:       [x] Normal Affect [] Abnormal -        [x] No Hallucinations    Other pertinent observable physical exam findings:-        We discussed the expected course, resolution and complications of the diagnosis(es) in detail.   Medication risks, benefits, costs, interactions, and alternatives were discussed as indicated. I advised her to contact the office if her condition worsens, changes or fails to improve as anticipated. She expressed understanding with the diagnosis(es) and plan. Edyta Davenport, was evaluated through a synchronous (real-time) audio-video encounter. The patient (or guardian if applicable) is aware that this is a billable service. Verbal consent to proceed has been obtained within the past 12 months. The visit was conducted pursuant to the emergency declaration under the [address] [place] [address] authority and the [employer] Act. Patient identification was verified, and a caregiver was present when appropriate. The patient was located in a state where the provider was credentialed to provide care.       Juaquin Appiah NP

## 2021-11-28 DIAGNOSIS — F32.A DEPRESSIVE DISORDER: ICD-10-CM

## 2021-11-28 RX ORDER — FLUOXETINE HYDROCHLORIDE 40 MG/1
CAPSULE ORAL
Qty: 90 CAPSULE | Refills: 0 | Status: SHIPPED | OUTPATIENT
Start: 2021-11-28 | End: 2022-03-22 | Stop reason: SDUPTHER

## 2022-01-04 DIAGNOSIS — F98.8 ADD (ATTENTION DEFICIT DISORDER) WITHOUT HYPERACTIVITY: ICD-10-CM

## 2022-01-07 NOTE — TELEPHONE ENCOUNTER
Requested Prescriptions     Pending Prescriptions Disp Refills    amphetamine-dextroamphetamine XR (ADDERALL XR) 10 mg XR capsule 60 Capsule 0     Sig: Take 1 Capsule by mouth every morning. Max Daily Amount: 10 mg.    ARIPiprazole (ABILIFY) 5 mg tablet 30 Tablet 1     Sig: Take 1 Tablet by mouth daily.  Stop Effexor

## 2022-01-12 RX ORDER — ARIPIPRAZOLE 5 MG/1
5 TABLET ORAL DAILY
Qty: 30 TABLET | Refills: 1 | Status: SHIPPED | OUTPATIENT
Start: 2022-01-12 | End: 2022-02-15 | Stop reason: SDUPTHER

## 2022-01-12 RX ORDER — DEXTROAMPHETAMINE SACCHARATE, AMPHETAMINE ASPARTATE MONOHYDRATE, DEXTROAMPHETAMINE SULFATE AND AMPHETAMINE SULFATE 2.5; 2.5; 2.5; 2.5 MG/1; MG/1; MG/1; MG/1
10 CAPSULE, EXTENDED RELEASE ORAL
Qty: 60 CAPSULE | Refills: 0 | Status: SHIPPED | OUTPATIENT
Start: 2022-01-12 | End: 2022-01-14 | Stop reason: SDUPTHER

## 2022-01-14 DIAGNOSIS — F98.8 ADD (ATTENTION DEFICIT DISORDER) WITHOUT HYPERACTIVITY: ICD-10-CM

## 2022-01-14 RX ORDER — DEXTROAMPHETAMINE SACCHARATE, AMPHETAMINE ASPARTATE MONOHYDRATE, DEXTROAMPHETAMINE SULFATE AND AMPHETAMINE SULFATE 2.5; 2.5; 2.5; 2.5 MG/1; MG/1; MG/1; MG/1
10 CAPSULE, EXTENDED RELEASE ORAL
Qty: 60 CAPSULE | Refills: 0 | Status: SHIPPED | OUTPATIENT
Start: 2022-01-14 | End: 2022-02-15 | Stop reason: SDUPTHER

## 2022-02-15 DIAGNOSIS — F98.8 ADD (ATTENTION DEFICIT DISORDER) WITHOUT HYPERACTIVITY: ICD-10-CM

## 2022-02-21 RX ORDER — DEXTROAMPHETAMINE SACCHARATE, AMPHETAMINE ASPARTATE MONOHYDRATE, DEXTROAMPHETAMINE SULFATE AND AMPHETAMINE SULFATE 2.5; 2.5; 2.5; 2.5 MG/1; MG/1; MG/1; MG/1
10 CAPSULE, EXTENDED RELEASE ORAL
Qty: 60 CAPSULE | Refills: 0 | Status: SHIPPED | OUTPATIENT
Start: 2022-02-21 | End: 2022-05-24 | Stop reason: SDUPTHER

## 2022-02-21 RX ORDER — ARIPIPRAZOLE 5 MG/1
5 TABLET ORAL DAILY
Qty: 30 TABLET | Refills: 1 | Status: SHIPPED | OUTPATIENT
Start: 2022-02-21 | End: 2022-03-23 | Stop reason: SDUPTHER

## 2022-03-18 PROBLEM — M51.369 DDD (DEGENERATIVE DISC DISEASE), LUMBAR: Status: ACTIVE | Noted: 2018-06-15

## 2022-03-18 PROBLEM — M51.36 DDD (DEGENERATIVE DISC DISEASE), LUMBAR: Status: ACTIVE | Noted: 2018-06-15

## 2022-03-19 PROBLEM — M47.817 LUMBOSACRAL SPONDYLOSIS WITHOUT MYELOPATHY: Status: ACTIVE | Noted: 2018-06-15

## 2022-03-19 PROBLEM — M54.50 LOW BACK PAIN WITHOUT SCIATICA: Status: ACTIVE | Noted: 2018-06-15

## 2022-03-22 DIAGNOSIS — F32.A DEPRESSIVE DISORDER: ICD-10-CM

## 2022-03-28 RX ORDER — FLUOXETINE HYDROCHLORIDE 40 MG/1
40 CAPSULE ORAL DAILY
Qty: 90 CAPSULE | Refills: 0 | Status: SHIPPED | OUTPATIENT
Start: 2022-03-28 | End: 2022-08-30 | Stop reason: SDUPTHER

## 2022-05-24 DIAGNOSIS — F98.8 ADD (ATTENTION DEFICIT DISORDER) WITHOUT HYPERACTIVITY: ICD-10-CM

## 2022-05-24 DIAGNOSIS — F32.A DEPRESSIVE DISORDER: Primary | ICD-10-CM

## 2022-05-24 NOTE — TELEPHONE ENCOUNTER
Patient is scheduled for 8/30/22 @ 1:00pm    Requested Prescriptions     Pending Prescriptions Disp Refills    amphetamine-dextroamphetamine XR (ADDERALL XR) 10 mg XR capsule 60 Capsule 0     Sig: Take 1 Capsule by mouth every morning. Max Daily Amount: 10 mg.    ARIPiprazole (ABILIFY) 5 mg tablet 30 Tablet 1     Sig: Take 1 Tablet by mouth daily.  Stop Effexor

## 2022-05-26 RX ORDER — ARIPIPRAZOLE 5 MG/1
5 TABLET ORAL DAILY
Qty: 30 TABLET | Refills: 1 | Status: SHIPPED | OUTPATIENT
Start: 2022-05-26 | End: 2022-07-01 | Stop reason: SDUPTHER

## 2022-05-26 RX ORDER — DEXTROAMPHETAMINE SACCHARATE, AMPHETAMINE ASPARTATE MONOHYDRATE, DEXTROAMPHETAMINE SULFATE AND AMPHETAMINE SULFATE 2.5; 2.5; 2.5; 2.5 MG/1; MG/1; MG/1; MG/1
10 CAPSULE, EXTENDED RELEASE ORAL
Qty: 60 CAPSULE | Refills: 0 | Status: SHIPPED | OUTPATIENT
Start: 2022-05-26 | End: 2022-08-30 | Stop reason: SDUPTHER

## 2022-07-01 DIAGNOSIS — F32.A DEPRESSIVE DISORDER: ICD-10-CM

## 2022-07-01 DIAGNOSIS — F98.8 ADD (ATTENTION DEFICIT DISORDER) WITHOUT HYPERACTIVITY: ICD-10-CM

## 2022-07-01 RX ORDER — ARIPIPRAZOLE 5 MG/1
5 TABLET ORAL DAILY
Qty: 30 TABLET | Refills: 1 | Status: SHIPPED | OUTPATIENT
Start: 2022-07-01 | End: 2022-08-30 | Stop reason: SDUPTHER

## 2022-07-01 RX ORDER — DEXTROAMPHETAMINE SACCHARATE, AMPHETAMINE ASPARTATE MONOHYDRATE, DEXTROAMPHETAMINE SULFATE AND AMPHETAMINE SULFATE 2.5; 2.5; 2.5; 2.5 MG/1; MG/1; MG/1; MG/1
10 CAPSULE, EXTENDED RELEASE ORAL
Qty: 60 CAPSULE | Refills: 0 | OUTPATIENT
Start: 2022-07-01

## 2022-07-01 NOTE — TELEPHONE ENCOUNTER
Requested Prescriptions     Pending Prescriptions Disp Refills    amphetamine-dextroamphetamine XR (ADDERALL XR) 10 mg XR capsule 60 Capsule 0     Sig: Take 1 Capsule by mouth every morning. Max Daily Amount: 10 mg.    ARIPiprazole (ABILIFY) 5 mg tablet 30 Tablet 1     Sig: Take 1 Tablet by mouth daily.      APPOINTMENT 08/30/22

## 2022-08-30 ENCOUNTER — OFFICE VISIT (OUTPATIENT)
Dept: FAMILY MEDICINE CLINIC | Age: 42
End: 2022-08-30
Payer: COMMERCIAL

## 2022-08-30 ENCOUNTER — HOSPITAL ENCOUNTER (OUTPATIENT)
Dept: LAB | Age: 42
Discharge: HOME | End: 2022-08-30
Payer: COMMERCIAL

## 2022-08-30 VITALS
SYSTOLIC BLOOD PRESSURE: 114 MMHG | DIASTOLIC BLOOD PRESSURE: 73 MMHG | BODY MASS INDEX: 29.57 KG/M2 | TEMPERATURE: 97.2 F | HEART RATE: 64 BPM | HEIGHT: 66 IN | OXYGEN SATURATION: 97 % | WEIGHT: 184 LBS

## 2022-08-30 DIAGNOSIS — Z00.00 ENCOUNTER FOR MEDICAL EXAMINATION TO ESTABLISH CARE: ICD-10-CM

## 2022-08-30 DIAGNOSIS — Z13.6 ENCOUNTER FOR LIPID SCREENING FOR CARDIOVASCULAR DISEASE: ICD-10-CM

## 2022-08-30 DIAGNOSIS — R63.5 WEIGHT GAIN: ICD-10-CM

## 2022-08-30 DIAGNOSIS — Z11.59 ENCOUNTER FOR HEPATITIS C SCREENING TEST FOR LOW RISK PATIENT: ICD-10-CM

## 2022-08-30 DIAGNOSIS — Z83.49 FAMILY HISTORY OF THYROID DISEASE IN MOTHER: ICD-10-CM

## 2022-08-30 DIAGNOSIS — Z13.220 ENCOUNTER FOR LIPID SCREENING FOR CARDIOVASCULAR DISEASE: ICD-10-CM

## 2022-08-30 DIAGNOSIS — F32.A DEPRESSIVE DISORDER: ICD-10-CM

## 2022-08-30 DIAGNOSIS — F98.8 ADD (ATTENTION DEFICIT DISORDER) WITHOUT HYPERACTIVITY: ICD-10-CM

## 2022-08-30 DIAGNOSIS — F98.8 ADD (ATTENTION DEFICIT DISORDER) WITHOUT HYPERACTIVITY: Primary | ICD-10-CM

## 2022-08-30 LAB
ALBUMIN SERPL-MCNC: 3.9 G/DL (ref 3.4–5)
ALBUMIN/GLOB SERPL: 1.3 {RATIO} (ref 0.8–1.7)
ALP SERPL-CCNC: 73 U/L (ref 45–117)
ALT SERPL-CCNC: 30 U/L (ref 13–56)
ANION GAP SERPL CALC-SCNC: 5 MMOL/L (ref 3–18)
AST SERPL-CCNC: 20 U/L (ref 10–38)
BASOPHILS # BLD: 0 K/UL (ref 0–0.1)
BASOPHILS NFR BLD: 0 % (ref 0–2)
BILIRUB SERPL-MCNC: 0.9 MG/DL (ref 0.2–1)
BUN SERPL-MCNC: 11 MG/DL (ref 7–18)
BUN/CREAT SERPL: 15 (ref 12–20)
CALCIUM SERPL-MCNC: 9.2 MG/DL (ref 8.5–10.1)
CHLORIDE SERPL-SCNC: 108 MMOL/L (ref 100–111)
CHOLEST SERPL-MCNC: 226 MG/DL
CO2 SERPL-SCNC: 25 MMOL/L (ref 21–32)
CREAT SERPL-MCNC: 0.72 MG/DL (ref 0.6–1.3)
DIFFERENTIAL METHOD BLD: ABNORMAL
EOSINOPHIL # BLD: 0.1 K/UL (ref 0–0.4)
EOSINOPHIL NFR BLD: 1 % (ref 0–5)
ERYTHROCYTE [DISTWIDTH] IN BLOOD BY AUTOMATED COUNT: 11.8 % (ref 11.6–14.5)
GLOBULIN SER CALC-MCNC: 3 G/DL (ref 2–4)
GLUCOSE SERPL-MCNC: 100 MG/DL (ref 74–99)
HCT VFR BLD AUTO: 39 % (ref 35–45)
HDLC SERPL-MCNC: 21 MG/DL (ref 40–60)
HDLC SERPL: 10.8 {RATIO} (ref 0–5)
HGB BLD-MCNC: 13 G/DL (ref 12–16)
IMM GRANULOCYTES # BLD AUTO: 0 K/UL (ref 0–0.04)
IMM GRANULOCYTES NFR BLD AUTO: 0 % (ref 0–0.5)
LDLC SERPL CALC-MCNC: ABNORMAL MG/DL (ref 0–100)
LIPID PROFILE,FLP: ABNORMAL
LYMPHOCYTES # BLD: 1.9 K/UL (ref 0.9–3.6)
LYMPHOCYTES NFR BLD: 29 % (ref 21–52)
MCH RBC QN AUTO: 32.3 PG (ref 24–34)
MCHC RBC AUTO-ENTMCNC: 33.3 G/DL (ref 31–37)
MCV RBC AUTO: 97 FL (ref 78–100)
MONOCYTES # BLD: 0.4 K/UL (ref 0.05–1.2)
MONOCYTES NFR BLD: 6 % (ref 3–10)
NEUTS SEG # BLD: 4.3 K/UL (ref 1.8–8)
NEUTS SEG NFR BLD: 64 % (ref 40–73)
NRBC # BLD: 0 K/UL (ref 0–0.01)
NRBC BLD-RTO: 0 PER 100 WBC
PLATELET # BLD AUTO: 293 K/UL (ref 135–420)
PMV BLD AUTO: 10.7 FL (ref 9.2–11.8)
POTASSIUM SERPL-SCNC: 4 MMOL/L (ref 3.5–5.5)
PROT SERPL-MCNC: 6.9 G/DL (ref 6.4–8.2)
RBC # BLD AUTO: 4.02 M/UL (ref 4.2–5.3)
SODIUM SERPL-SCNC: 138 MMOL/L (ref 136–145)
TRIGL SERPL-MCNC: 552 MG/DL (ref ?–150)
TSH SERPL-ACNC: 1.32 UIU/ML (ref 0.36–3.74)
VLDLC SERPL CALC-MCNC: ABNORMAL MG/DL
WBC # BLD AUTO: 6.8 K/UL (ref 4.6–13.2)

## 2022-08-30 PROCEDURE — 80061 LIPID PANEL: CPT

## 2022-08-30 PROCEDURE — 85025 COMPLETE CBC W/AUTO DIFF WBC: CPT

## 2022-08-30 PROCEDURE — 99214 OFFICE O/P EST MOD 30 MIN: CPT | Performed by: STUDENT IN AN ORGANIZED HEALTH CARE EDUCATION/TRAINING PROGRAM

## 2022-08-30 PROCEDURE — 80053 COMPREHEN METABOLIC PANEL: CPT

## 2022-08-30 PROCEDURE — 86803 HEPATITIS C AB TEST: CPT

## 2022-08-30 PROCEDURE — 84443 ASSAY THYROID STIM HORMONE: CPT

## 2022-08-30 PROCEDURE — 36415 COLL VENOUS BLD VENIPUNCTURE: CPT

## 2022-08-30 PROCEDURE — 86376 MICROSOMAL ANTIBODY EACH: CPT

## 2022-08-30 RX ORDER — DEXTROAMPHETAMINE SACCHARATE, AMPHETAMINE ASPARTATE MONOHYDRATE, DEXTROAMPHETAMINE SULFATE AND AMPHETAMINE SULFATE 2.5; 2.5; 2.5; 2.5 MG/1; MG/1; MG/1; MG/1
10 CAPSULE, EXTENDED RELEASE ORAL
Qty: 60 CAPSULE | Refills: 0 | Status: SHIPPED | OUTPATIENT
Start: 2022-08-30

## 2022-08-30 RX ORDER — ARIPIPRAZOLE 5 MG/1
5 TABLET ORAL DAILY
Qty: 30 TABLET | Refills: 1 | Status: SHIPPED | OUTPATIENT
Start: 2022-08-30

## 2022-08-30 RX ORDER — FLUOXETINE HYDROCHLORIDE 40 MG/1
40 CAPSULE ORAL DAILY
Qty: 90 CAPSULE | Refills: 0 | Status: SHIPPED | OUTPATIENT
Start: 2022-08-30

## 2022-08-30 NOTE — PROGRESS NOTES
1. \"Have you been to the ER, urgent care clinic since your last visit? Hospitalized since your last visit? \" No    2. \"Have you seen or consulted any other health care providers outside of the [address] [place] [address] since your last visit? \" No     3. For patients aged 39-70: Has the patient had a colonoscopy / FIT/ Cologuard? NA - based on age      If the patient is female:    4. For patients aged 41-77: Has the patient had a mammogram within the past 2 years? No      5. For patients aged 21-65: Has the patient had a pap smear?  No

## 2022-08-30 NOTE — PROGRESS NOTES
Antonio Hendrickson is a 38 y.o. female presenting today for Follow-up (Satish landry w/ Dr. Jennifer Cagle)  . Chief Complaint   Patient presents with    Follow-up     Satish landry w/ Dr. Jennifer Cagle       HPI:  Antonio Hendrickson presents to the office today to establish care. Patient has a past medical history of depression, ADHD. Depression: Patient has been on Prozac but last visit with her PCP she continued to describe having low energy. She was switched over to Abilify 5 mg daily. States that her symptoms are significantly improved with these medications. However, she still has decreased motivation. She has been unable to tolerate BuSpar or Wellbutrin in the past.    ADHD: Patient was previously described Adderall XR 10 mg twice daily. She stopped taking it because it made her feel worsening anger and depression. She still feels decreased ability to focus. She has gained 40 lbs in the past year. She is concerned it might be her medications. She has no change to her diet and activity. Review of Systems   Constitutional:  Negative for chills, diaphoresis, fever, malaise/fatigue and weight loss. HENT:  Negative for congestion, ear discharge, ear pain, hearing loss, nosebleeds, sinus pain, sore throat and tinnitus. Eyes:  Negative for blurred vision, double vision and photophobia. Respiratory:  Negative for cough, sputum production, shortness of breath, wheezing and stridor. Cardiovascular:  Negative for chest pain, palpitations, orthopnea, claudication and leg swelling. Gastrointestinal:  Negative for abdominal pain, constipation, diarrhea, heartburn, nausea and vomiting. Genitourinary:  Negative for dysuria, flank pain, frequency, hematuria and urgency. Musculoskeletal:  Negative for back pain, joint pain, myalgias and neck pain. Skin:  Negative for rash. Neurological:  Negative for tingling, tremors, sensory change, speech change, focal weakness, seizures, weakness and headaches. Psychiatric/Behavioral:  Positive for depression. The patient is nervous/anxious. All other systems reviewed and are negative.     Allergies   Allergen Reactions    Buspar [Buspirone] Anaphylaxis and Other (comments)     Dizziness  Lightheadedness       PHQ Screening   3 most recent PHQ Screens 8/30/2022   Little interest or pleasure in doing things Several days   Feeling down, depressed, irritable, or hopeless Several days   Total Score PHQ 2 2   Trouble falling or staying asleep, or sleeping too much -   Feeling tired or having little energy -   Poor appetite, weight loss, or overeating -   Feeling bad about yourself - or that you are a failure or have let yourself or your family down -   Trouble concentrating on things such as school, work, reading, or watching TV -   Moving or speaking so slowly that other people could have noticed; or the opposite being so fidgety that others notice -   Thoughts of being better off dead, or hurting yourself in some way -   PHQ 9 Score -   How difficult have these problems made it for you to do your work, take care of your home and get along with others -       History  Past Medical History:   Diagnosis Date    Acute reaction to stress     ADD (attention deficit disorder) without hyperactivity     Depressive disorder     GERD (gastroesophageal reflux disease)     Influenza     Low back pain     Sciatica     Sinusitis     Spasm of muscle     Strain of knee        Past Surgical History:   Procedure Laterality Date    HX TONSILLECTOMY      HX WISDOM TEETH EXTRACTION         Social History     Socioeconomic History    Marital status:      Spouse name: Not on file    Number of children: Not on file    Years of education: Not on file    Highest education level: Not on file   Occupational History    Not on file   Tobacco Use    Smoking status: Never    Smokeless tobacco: Never   Substance and Sexual Activity    Alcohol use: Yes     Comment: socially    Drug use: No    Sexual activity: Yes   Other Topics Concern    Not on file   Social History Narrative    Not on file     Social Determinants of Health     Financial Resource Strain: Not on file   Food Insecurity: Not on file   Transportation Needs: Not on file   Physical Activity: Insufficiently Active    Days of Exercise per Week: 3 days    Minutes of Exercise per Session: 30 min   Stress: Not on file   Social Connections: Not on file   Intimate Partner Violence: Not At Risk    Fear of Current or Ex-Partner: No    Emotionally Abused: No    Physically Abused: No    Sexually Abused: No   Housing Stability: Not on file       Current Outpatient Medications   Medication Sig Dispense Refill    amphetamine-dextroamphetamine XR (ADDERALL XR) 10 mg XR capsule Take 1 Capsule by mouth every morning. Max Daily Amount: 10 mg. 60 Capsule 0    ARIPiprazole (ABILIFY) 5 mg tablet Take 1 Tablet by mouth daily. 30 Tablet 1    FLUoxetine (PROzac) 40 mg capsule Take 1 Capsule by mouth daily. 90 Capsule 0    acyclovir (ZOVIRAX) 400 mg tablet Take 1 Tablet by mouth every four (4) hours (while awake). 60 Tablet 2         Vitals:    08/30/22 1310   BP: 114/73   Pulse: 64   Temp: 97.2 °F (36.2 °C)   TempSrc: Oral   SpO2: 97%   Weight: 184 lb (83.5 kg)   Height: 5' 6\" (1.676 m)   PainSc:   0 - No pain       Physical Exam  Vitals and nursing note reviewed. Constitutional:       General: She is not in acute distress. Appearance: Normal appearance. She is not ill-appearing, toxic-appearing or diaphoretic. HENT:      Head: Normocephalic and atraumatic. Nose: Nose normal. No congestion or rhinorrhea. Mouth/Throat:      Mouth: Mucous membranes are moist.      Pharynx: Oropharynx is clear. No oropharyngeal exudate or posterior oropharyngeal erythema. Eyes:      General: No scleral icterus. Extraocular Movements: Extraocular movements intact. Conjunctiva/sclera: Conjunctivae normal.      Pupils: Pupils are equal, round, and reactive to light. Cardiovascular:      Rate and Rhythm: Normal rate and regular rhythm. Pulses: Normal pulses. Heart sounds: No murmur heard. No gallop. Pulmonary:      Effort: Pulmonary effort is normal. No respiratory distress. Breath sounds: Normal breath sounds. No wheezing or rales. Abdominal:      General: Bowel sounds are normal. There is no distension. Palpations: Abdomen is soft. Tenderness: There is no abdominal tenderness. There is no guarding. Musculoskeletal:         General: Normal range of motion. Cervical back: Normal range of motion. Right lower leg: No edema. Left lower leg: No edema. Skin:     General: Skin is warm and dry. Coloration: Skin is not jaundiced or pale. Neurological:      General: No focal deficit present. Mental Status: She is alert and oriented to person, place, and time. Mental status is at baseline. Cranial Nerves: No cranial nerve deficit. Motor: No weakness. Gait: Gait normal.   Psychiatric:         Mood and Affect: Mood normal.         Behavior: Behavior normal.         Thought Content: Thought content normal.         Judgment: Judgment normal.       No visits with results within 3 Month(s) from this visit.    Latest known visit with results is:   Hospital Outpatient Visit on 07/10/2019   Component Date Value Ref Range Status    WBC 07/10/2019 8.9  4.6 - 13.2 K/uL Final    RBC 07/10/2019 4.19 (A) 4.20 - 5.30 M/uL Final    HGB 07/10/2019 13.8  12.0 - 16.0 g/dL Final    HCT 07/10/2019 42.9  35.0 - 45.0 % Final    MCV 07/10/2019 102.4 (A) 74.0 - 97.0 FL Final    MCH 07/10/2019 32.9  24.0 - 34.0 PG Final    MCHC 07/10/2019 32.2  31.0 - 37.0 g/dL Final    RDW 07/10/2019 12.2  11.6 - 14.5 % Final    PLATELET 62/30/4751 565  135 - 420 K/uL Final    MPV 07/10/2019 10.3  9.2 - 11.8 FL Final    NEUTROPHILS 07/10/2019 70  40 - 73 % Final    LYMPHOCYTES 07/10/2019 24  21 - 52 % Final    MONOCYTES 07/10/2019 5  3 - 10 % Final EOSINOPHILS 07/10/2019 1  0 - 5 % Final    BASOPHILS 07/10/2019 0  0 - 2 % Final    ABS. NEUTROPHILS 07/10/2019 6.2  1.8 - 8.0 K/UL Final    ABS. LYMPHOCYTES 07/10/2019 2.1  0.9 - 3.6 K/UL Final    ABS. MONOCYTES 07/10/2019 0.5  0.05 - 1.2 K/UL Final    ABS. EOSINOPHILS 07/10/2019 0.1  0.0 - 0.4 K/UL Final    ABS. BASOPHILS 07/10/2019 0.0  0.0 - 0.1 K/UL Final    DF 07/10/2019 AUTOMATED    Final    Sodium 07/10/2019 136  136 - 145 mmol/L Final    Potassium 07/10/2019 4.3  3.5 - 5.5 mmol/L Final    Chloride 07/10/2019 101  100 - 108 mmol/L Final    CO2 07/10/2019 28  21 - 32 mmol/L Final    Anion gap 07/10/2019 7  3.0 - 18 mmol/L Final    Glucose 07/10/2019 82  74 - 99 mg/dL Final    BUN 07/10/2019 13  7.0 - 18 MG/DL Final    Creatinine 07/10/2019 0.68  0.6 - 1.3 MG/DL Final    BUN/Creatinine ratio 07/10/2019 19  12 - 20   Final    GFR est AA 07/10/2019 >60  >60 ml/min/1.73m2 Final    GFR est non-AA 07/10/2019 >60  >60 ml/min/1.73m2 Final    Comment: (NOTE)  Estimated GFR is calculated using the Modification of Diet in Renal   Disease (MDRD) Study equation, reported for both  Americans   (GFRAA) and non- Americans (GFRNA), and normalized to 1.73m2   body surface area. The physician must decide which value applies to   the patient. The MDRD study equation should only be used in   individuals age 25 or older. It has not been validated for the   following: pregnant women, patients with serious comorbid conditions,   or on certain medications, or persons with extremes of body size,   muscle mass, or nutritional status. Calcium 07/10/2019 9.3  8.5 - 10.1 MG/DL Final    Bilirubin, total 07/10/2019 0.6  0.2 - 1.0 MG/DL Final    ALT (SGPT) 07/10/2019 29  13 - 56 U/L Final    AST (SGOT) 07/10/2019 21  15 - 37 U/L Final    Alk.  phosphatase 07/10/2019 68  45 - 117 U/L Final    Protein, total 07/10/2019 7.1  6.4 - 8.2 g/dL Final    Albumin 07/10/2019 4.2  3.4 - 5.0 g/dL Final    Globulin 07/10/2019 2.9  2.0 - 4.0 g/dL Final    A-G Ratio 07/10/2019 1.4  0.8 - 1.7   Final    TSH 07/10/2019 1.37  0.36 - 3.74 uIU/mL Final    Antinuclear Abs, IFA 07/10/2019 NEGATIVE     Final    Comment: (NOTE)                                      Negative   <1:80                                      Borderline  1:80                                      Positive   >1:80  Performed At: [facility] & [address] [id number]  Shannan Falk MD [id number]         No results found for any visits on 08/30/22. Patient Care Team:  Patient Care Team:  Alea Levy MD as PCP - General (Internal Medicine Physician)  Domingo Bear NP (Obstetrics & Gynecology)      Assessment / Plan:      ICD-10-CM ICD-9-CM    1. ADD (attention deficit disorder) without hyperactivity  F98.8 314.00 CBC WITH AUTOMATED DIFF      METABOLIC PANEL, COMPREHENSIVE      REFERRAL TO PSYCHIATRY      amphetamine-dextroamphetamine XR (ADDERALL XR) 10 mg XR capsule      2. Depressive disorder  F32. A 311 CBC WITH AUTOMATED DIFF      METABOLIC PANEL, COMPREHENSIVE      REFERRAL TO PSYCHIATRY      ARIPiprazole (ABILIFY) 5 mg tablet      FLUoxetine (PROzac) 40 mg capsule      3. Encounter for hepatitis C screening test for low risk patient  Z11.59 V73.89 HEPATITIS C AB      4. Encounter for lipid screening for cardiovascular disease  Z13.220 V77.91 LIPID PANEL    Z13.6 V81.2       5. Encounter for medical examination to establish care  Z00.00 V70.9       6. Family history of thyroid disease in mother  Z80.51 V18.19 TSH W/ REFLX FREE T4 IF ABNORMAL      THYROID ANTIBODY PANEL      7. Weight gain  R63.5 783.1 TSH W/ REFLX FREE T4 IF ABNORMAL      THYROID ANTIBODY PANEL        ADHD: Patient wants to be started back on Adderall XR. Refilled. Depression: Continue Prozac and Abilify. We discussed that the Abilify could be the likely cause of the weight gain. Check lipid panel, CMP, CBC. Will refer to psychiatry.     Weight gain: Patient reports approximately 40 pounds weight gain over the past year since starting the psychiatric medications. Check TSH/T4 and thyroid antibodies. She does have a family history of thyroid disease. Follow-up and Dispositions    Return in about 3 months (around 11/30/2022). I asked the patient if she  had any questions and answered her  questions. The patient stated that she understands the treatment plan and agrees with the treatment plan    This document was created with a voice activated dictation system and may contain transcription errors.

## 2022-08-31 LAB
HCV AB SER IA-ACNC: 0.03 INDEX
HCV AB SERPL QL IA: NEGATIVE
HCV COMMENT,HCGAC: NORMAL

## 2022-09-02 LAB
THYROGLOB AB SERPL-ACNC: <1 IU/ML (ref 0–0.9)
THYROPEROXIDASE AB SERPL-ACNC: <8 IU/ML (ref 0–34)

## 2023-01-18 ENCOUNTER — OFFICE VISIT (OUTPATIENT)
Dept: FAMILY MEDICINE CLINIC | Age: 43
End: 2023-01-18
Payer: COMMERCIAL

## 2023-01-18 VITALS
SYSTOLIC BLOOD PRESSURE: 125 MMHG | BODY MASS INDEX: 27.8 KG/M2 | HEART RATE: 83 BPM | RESPIRATION RATE: 14 BRPM | HEIGHT: 66 IN | WEIGHT: 173 LBS | DIASTOLIC BLOOD PRESSURE: 85 MMHG | OXYGEN SATURATION: 97 %

## 2023-01-18 DIAGNOSIS — K64.9 HEMORRHOIDS, UNSPECIFIED HEMORRHOID TYPE: Primary | ICD-10-CM

## 2023-01-18 DIAGNOSIS — F98.8 ADD (ATTENTION DEFICIT DISORDER) WITHOUT HYPERACTIVITY: ICD-10-CM

## 2023-01-18 DIAGNOSIS — F32.A DEPRESSIVE DISORDER: ICD-10-CM

## 2023-01-18 DIAGNOSIS — K92.1 BLOOD IN STOOL: ICD-10-CM

## 2023-01-18 DIAGNOSIS — K58.2 IRRITABLE BOWEL SYNDROME WITH BOTH CONSTIPATION AND DIARRHEA: ICD-10-CM

## 2023-01-18 DIAGNOSIS — E78.1 HYPERTRIGLYCERIDEMIA: ICD-10-CM

## 2023-01-18 PROCEDURE — 99214 OFFICE O/P EST MOD 30 MIN: CPT | Performed by: STUDENT IN AN ORGANIZED HEALTH CARE EDUCATION/TRAINING PROGRAM

## 2023-01-18 RX ORDER — BUPROPION HYDROCHLORIDE 300 MG/1
300 TABLET ORAL DAILY
COMMUNITY
Start: 2023-01-07

## 2023-01-18 RX ORDER — HYDROCORTISONE 25 MG/G
CREAM TOPICAL 4 TIMES DAILY
Qty: 30 G | Refills: 2 | Status: SHIPPED | OUTPATIENT
Start: 2023-01-18

## 2023-01-18 NOTE — PROGRESS NOTES
Karyl Hatchet is a 38 y.o. female presenting today for Hemorrhoids (Stomach issues since middle of November, either diarrhea or constipation. Cramping in left side and back. Water intake is sufficient. No diet changes but has taken metamucil this month with no relief. Only change has been medication added to Wellbutrin and taken off Prozac. Will have OB/GYN appt 3/2023. )  . Chief Complaint   Patient presents with    Hemorrhoids     Stomach issues since middle of November, either diarrhea or constipation. Cramping in left side and back. Water intake is sufficient. No diet changes but has taken metamucil this month with no relief. Only change has been medication added to Wellbutrin and taken off Prozac. Will have OB/GYN appt 3/2023. HPI:  Karyl Hatchet presents to the office today for follow up. Patient has a past medical history of depression, ADHD. Depression: Patient has been on Prozac but last visit with her PCP she continued to describe having low energy. She was switched over to Abilify 5 mg daily. States that her symptoms are significantly improved with these medications. However, she still has decreased motivation. She has been unable to tolerate BuSpar or Wellbutrin in the past.    Hypertriglyceridemia: Panel in 8/22 showed elevated triglycerides, however patient reports that had the test after having a meal.    ADHD: Patient was previously described Adderall XR 10 mg twice daily. She stopped taking it because it made her feel worsening anger and depression. She still feels decreased ability to focus. She has gained 40 lbs in the past year. She is concerned it might be her medications. She has no change to her diet and activity. Patient has lost weight since last visit. She is down to 173 pounds from 184 previously. Reports intermittent constipation/diarrhea since November. She has now started taking Metamucil. Her BMs are painful and she has urgency.  She has noted blood in the stool when she wipes. She has a history of hemorrhoids. Recently she has also been having stomach cramps. Review of Systems   Constitutional:  Negative for chills, diaphoresis, fever, malaise/fatigue and weight loss. HENT:  Negative for congestion, ear discharge, ear pain, hearing loss, nosebleeds, sinus pain, sore throat and tinnitus. Eyes:  Negative for blurred vision, double vision and photophobia. Respiratory:  Negative for cough, sputum production, shortness of breath, wheezing and stridor. Cardiovascular:  Negative for chest pain, palpitations, orthopnea, claudication and leg swelling. Gastrointestinal:  Positive for abdominal pain, blood in stool, constipation and diarrhea. Negative for heartburn, nausea and vomiting. Genitourinary:  Negative for dysuria, flank pain, frequency, hematuria and urgency. Musculoskeletal:  Negative for back pain, joint pain, myalgias and neck pain. Skin:  Negative for rash. Neurological:  Negative for tingling, tremors, sensory change, speech change, focal weakness, seizures, weakness and headaches. Psychiatric/Behavioral:  Positive for depression. The patient is nervous/anxious. All other systems reviewed and are negative.     Allergies   Allergen Reactions    Buspar [Buspirone] Anaphylaxis and Other (comments)     Dizziness  Lightheadedness       PHQ Screening   3 most recent PHQ Screens 1/18/2023   Little interest or pleasure in doing things Not at all   Feeling down, depressed, irritable, or hopeless Not at all   Total Score PHQ 2 0   Trouble falling or staying asleep, or sleeping too much -   Feeling tired or having little energy -   Poor appetite, weight loss, or overeating -   Feeling bad about yourself - or that you are a failure or have let yourself or your family down -   Trouble concentrating on things such as school, work, reading, or watching TV -   Moving or speaking so slowly that other people could have noticed; or the opposite being so fidgety that others notice -   Thoughts of being better off dead, or hurting yourself in some way -   PHQ 9 Score -   How difficult have these problems made it for you to do your work, take care of your home and get along with others -       History  Past Medical History:   Diagnosis Date    Acute reaction to stress     ADD (attention deficit disorder) without hyperactivity     Depressive disorder     GERD (gastroesophageal reflux disease)     Influenza     Low back pain     Sciatica     Sinusitis     Spasm of muscle     Strain of knee        Past Surgical History:   Procedure Laterality Date    HX TONSILLECTOMY      HX WISDOM TEETH EXTRACTION         Social History     Socioeconomic History    Marital status:      Spouse name: Not on file    Number of children: Not on file    Years of education: Not on file    Highest education level: Not on file   Occupational History    Not on file   Tobacco Use    Smoking status: Never    Smokeless tobacco: Never   Substance and Sexual Activity    Alcohol use: Yes     Comment: socially    Drug use: No    Sexual activity: Yes   Other Topics Concern    Not on file   Social History Narrative    Not on file     Social Determinants of Health     Financial Resource Strain: Not on file   Food Insecurity: Not on file   Transportation Needs: Not on file   Physical Activity: Insufficiently Active    Days of Exercise per Week: 3 days    Minutes of Exercise per Session: 30 min   Stress: Not on file   Social Connections: Not on file   Intimate Partner Violence: Not At Risk    Fear of Current or Ex-Partner: No    Emotionally Abused: No    Physically Abused: No    Sexually Abused: No   Housing Stability: Not on file       Current Outpatient Medications   Medication Sig Dispense Refill    buPROPion XL (WELLBUTRIN XL) 300 mg XL tablet Take 300 mg by mouth daily. hydrocortisone (ANUSOL-HC) 2.5 % rectal cream Insert  into rectum four (4) times daily.  30 g 2 amphetamine-dextroamphetamine XR (ADDERALL XR) 10 mg XR capsule Take 1 Capsule by mouth every morning. Max Daily Amount: 10 mg. 60 Capsule 0    ARIPiprazole (ABILIFY) 5 mg tablet Take 1 Tablet by mouth daily. 30 Tablet 1    FLUoxetine (PROzac) 40 mg capsule Take 1 Capsule by mouth daily. (Patient not taking: Reported on 1/18/2023) 90 Capsule 0    acyclovir (ZOVIRAX) 400 mg tablet Take 1 Tablet by mouth every four (4) hours (while awake). (Patient not taking: Reported on 1/18/2023) 60 Tablet 2         Vitals:    01/18/23 1645   BP: 125/85   Pulse: 83   Resp: 14   SpO2: 97%   Weight: 173 lb (78.5 kg)   Height: 5' 6\" (1.676 m)   PainSc:   2   PainLoc: Back       Physical Exam  Vitals and nursing note reviewed. Constitutional:       General: She is not in acute distress. Appearance: Normal appearance. She is not ill-appearing, toxic-appearing or diaphoretic. HENT:      Head: Normocephalic and atraumatic. Nose: Nose normal. No congestion or rhinorrhea. Mouth/Throat:      Mouth: Mucous membranes are moist.      Pharynx: Oropharynx is clear. No oropharyngeal exudate or posterior oropharyngeal erythema. Eyes:      General: No scleral icterus. Extraocular Movements: Extraocular movements intact. Conjunctiva/sclera: Conjunctivae normal.      Pupils: Pupils are equal, round, and reactive to light. Cardiovascular:      Rate and Rhythm: Normal rate and regular rhythm. Pulses: Normal pulses. Heart sounds: No murmur heard. No gallop. Pulmonary:      Effort: Pulmonary effort is normal. No respiratory distress. Breath sounds: Normal breath sounds. No wheezing or rales. Abdominal:      General: Bowel sounds are normal. There is no distension. Palpations: Abdomen is soft. Tenderness: There is no abdominal tenderness. There is no guarding. Musculoskeletal:         General: Normal range of motion. Cervical back: Normal range of motion. Right lower leg: No edema. Left lower leg: No edema. Skin:     General: Skin is warm and dry. Coloration: Skin is not jaundiced or pale. Neurological:      General: No focal deficit present. Mental Status: She is alert and oriented to person, place, and time. Mental status is at baseline. Cranial Nerves: No cranial nerve deficit. Motor: No weakness. Gait: Gait normal.   Psychiatric:         Mood and Affect: Mood normal.         Behavior: Behavior normal.         Thought Content: Thought content normal.         Judgment: Judgment normal.       No visits with results within 3 Month(s) from this visit. Latest known visit with results is:   Hospital Outpatient Visit on 08/30/2022   Component Date Value Ref Range Status    WBC 08/30/2022 6.8  4.6 - 13.2 K/uL Final    RBC 08/30/2022 4.02 (A)  4.20 - 5.30 M/uL Final    HGB 08/30/2022 13.0  12.0 - 16.0 g/dL Final    HCT 08/30/2022 39.0  35.0 - 45.0 % Final    MCV 08/30/2022 97.0  78.0 - 100.0 FL Final    MCH 08/30/2022 32.3  24.0 - 34.0 PG Final    MCHC 08/30/2022 33.3  31.0 - 37.0 g/dL Final    RDW 08/30/2022 11.8  11.6 - 14.5 % Final    PLATELET 63/94/7043 632  135 - 420 K/uL Final    MPV 08/30/2022 10.7  9.2 - 11.8 FL Final    NRBC 08/30/2022 0.0  0  WBC Final    ABSOLUTE NRBC 08/30/2022 0.00  0.00 - 0.01 K/uL Final    NEUTROPHILS 08/30/2022 64  40 - 73 % Final    LYMPHOCYTES 08/30/2022 29  21 - 52 % Final    MONOCYTES 08/30/2022 6  3 - 10 % Final    EOSINOPHILS 08/30/2022 1  0 - 5 % Final    BASOPHILS 08/30/2022 0  0 - 2 % Final    IMMATURE GRANULOCYTES 08/30/2022 0  0.0 - 0.5 % Final    ABS. NEUTROPHILS 08/30/2022 4.3  1.8 - 8.0 K/UL Final    ABS. LYMPHOCYTES 08/30/2022 1.9  0.9 - 3.6 K/UL Final    ABS. MONOCYTES 08/30/2022 0.4  0.05 - 1.2 K/UL Final    ABS. EOSINOPHILS 08/30/2022 0.1  0.0 - 0.4 K/UL Final    ABS. BASOPHILS 08/30/2022 0.0  0.0 - 0.1 K/UL Final    ABS. IMM.  GRANS. 08/30/2022 0.0  0.00 - 0.04 K/UL Final    DF 08/30/2022 AUTOMATED    Final Sodium 08/30/2022 138  136 - 145 mmol/L Final    Potassium 08/30/2022 4.0  3.5 - 5.5 mmol/L Final    Chloride 08/30/2022 108  100 - 111 mmol/L Final    CO2 08/30/2022 25  21 - 32 mmol/L Final    Anion gap 08/30/2022 5  3.0 - 18 mmol/L Final    Glucose 08/30/2022 100 (A)  74 - 99 mg/dL Final    BUN 08/30/2022 11  7.0 - 18 MG/DL Final    Creatinine 08/30/2022 0.72  0.6 - 1.3 MG/DL Final    BUN/Creatinine ratio 08/30/2022 15  12 - 20   Final    GFR est AA 08/30/2022 >60  >60 ml/min/1.73m2 Final    GFR est non-AA 08/30/2022 >60  >60 ml/min/1.73m2 Final    Comment: (NOTE)  Estimated GFR is calculated using the Modification of Diet in Renal   Disease (MDRD) Study equation, reported for both  Americans   (GFRAA) and non- Americans (GFRNA), and normalized to 1.73m2   body surface area. The physician must decide which value applies to   the patient. The MDRD study equation should only be used in   individuals age 25 or older. It has not been validated for the   following: pregnant women, patients with serious comorbid conditions,   or on certain medications, or persons with extremes of body size,   muscle mass, or nutritional status. Calcium 08/30/2022 9.2  8.5 - 10.1 MG/DL Final    Bilirubin, total 08/30/2022 0.9  0.2 - 1.0 MG/DL Final    ALT (SGPT) 08/30/2022 30  13 - 56 U/L Final    AST (SGOT) 08/30/2022 20  10 - 38 U/L Final    Alk.  phosphatase 08/30/2022 73  45 - 117 U/L Final    Protein, total 08/30/2022 6.9  6.4 - 8.2 g/dL Final    Albumin 08/30/2022 3.9  3.4 - 5.0 g/dL Final    Globulin 08/30/2022 3.0  2.0 - 4.0 g/dL Final    A-G Ratio 08/30/2022 1.3  0.8 - 1.7   Final    TSH 08/30/2022 1.32  0.36 - 3.74 uIU/mL Final    LIPID PROFILE 08/30/2022        Final    Cholesterol, total 08/30/2022 226 (A)  <200 MG/DL Final    Triglyceride 08/30/2022 552 (A)  <150 MG/DL Final    Comment: The drugs N-acetylcysteine (NAC) and  Metamiszole have been found to cause falsely  low results in this chemical assay. Please  be sure to submit blood samples obtained  BEFORE administration of either of these  drugs to assure correct results. HDL Cholesterol 08/30/2022 21 (A)  40 - 60 MG/DL Final    LDL, calculated 08/30/2022 LDL AND VLDL CHOLESTEROL NOT CALCULATED WHEN TRIGLYCERIDES >400 MG/DL OR HDL CHOLESTEROL <20 MG/DL  0 - 100 MG/DL Final    VLDL, calculated 08/30/2022 Calculation not valid with this patient's other Lipid values. MG/DL Final    CHOL/HDL Ratio 08/30/2022 10.8 (A)  0 - 5.0   Final    Hepatitis C virus Ab 08/30/2022 0.03  <0.80 Index Final    Hep C virus Ab Interp. 08/30/2022 Negative  NEG   Final    Hep C  virus Ab comment 08/30/2022        Final    Comment: Index <0.80. ......................... Jestine Courts Negative  Index > or = to 0.80 and <1.00. .... Jestine Courts Jestine Courts Equivocal  Index >1.00. ......................... Jestine Courts Positive          For Equivocal or Positive results, confirmation with Hepatitis C RNA by PCR or bDNA is suggested. Thyroid peroxidase Ab 08/30/2022 <8  0 - 34 IU/mL Final    Comment: (NOTE)  Performed At: [facility] [place]  [facility] [address] [id number]  Richy Carver MD CC:[id number]      Thyroglobulin Ab 08/30/2022 <1.0  0.0 - 0.9 IU/mL Final    Comment: (NOTE)  Thyroglobulin Antibody measured by [facility]  Performed At: [facility] [place]  [facility] [address] [id number]  Richy Carver MD SK:[id number]         No results found for any visits on 01/18/23. Patient Care Team:  Patient Care Team:  Gita Hopkins MD as PCP - General (Internal Medicine Physician)  Gita Hopkins MD [facility] PCP [facility]  [facility] NP (Obstetrics & Gynecology)      Assessment / Plan:      ICD-10-CM ICD-9-CM    1. Hemorrhoids, unspecified hemorrhoid type  K64.9 455.6 hydrocortisone (ANUSOL-HC) 2.5 % rectal cream      2. Blood in stool  K92.1 578.1       3.  Irritable bowel syndrome with both constipation and diarrhea  K58.2 564.1 REFERRAL TO GASTROENTEROLOGY 4. Hypertriglyceridemia  E78.1 272.1 LIPID PANEL      HEMOGLOBIN A1C WITH EAG      METABOLIC PANEL, COMPREHENSIVE      5. ADD (attention deficit disorder) without hyperactivity  F98.8 314.00       6. Depressive disorder  F32. A 311         Depression with ADHD: Patient is now following with psychiatry. She is on Abilify, Wellbutrin and Adderall. Reports that her mood is stable. She has also lost weight. IBS: Patient reports pain with defecation, constipation alternating with diarrhea. She has a history of hemorrhoids and reports bleeding. Will refer to GI. Advised to take MiraLAX on the days that she has constipation to avoid straining. Will prescribe anusol    Hypertriglyceridemia: Check lipid panel, HbA1c, CMP. Follow-up and Dispositions    Return in about 6 months (around 7/18/2023). I asked the patient if she  had any questions and answered her  questions. The patient stated that she understands the treatment plan and agrees with the treatment plan    This document was created with a voice activated dictation system and may contain transcription errors.

## 2023-01-24 ENCOUNTER — HOSPITAL ENCOUNTER (OUTPATIENT)
Dept: LAB | Age: 43
Discharge: HOME | End: 2023-01-24
Payer: COMMERCIAL

## 2023-01-24 DIAGNOSIS — E78.1 HYPERTRIGLYCERIDEMIA: ICD-10-CM

## 2023-01-24 LAB
ALBUMIN SERPL-MCNC: 4 G/DL (ref 3.4–5)
ALBUMIN/GLOB SERPL: 1.3 (ref 0.8–1.7)
ALP SERPL-CCNC: 70 U/L (ref 45–117)
ALT SERPL-CCNC: 22 U/L (ref 13–56)
ANION GAP SERPL CALC-SCNC: 3 MMOL/L (ref 3–18)
AST SERPL-CCNC: 10 U/L (ref 10–38)
BILIRUB SERPL-MCNC: 1.1 MG/DL (ref 0.2–1)
BUN SERPL-MCNC: 12 MG/DL (ref 7–18)
BUN/CREAT SERPL: 13 (ref 12–20)
CALCIUM SERPL-MCNC: 9.3 MG/DL (ref 8.5–10.1)
CHLORIDE SERPL-SCNC: 107 MMOL/L (ref 100–111)
CHOLEST SERPL-MCNC: 212 MG/DL
CO2 SERPL-SCNC: 28 MMOL/L (ref 21–32)
CREAT SERPL-MCNC: 0.9 MG/DL (ref 0.6–1.3)
EST. AVERAGE GLUCOSE BLD GHB EST-MCNC: 111 MG/DL
GLOBULIN SER CALC-MCNC: 3 G/DL (ref 2–4)
GLUCOSE SERPL-MCNC: 115 MG/DL (ref 74–99)
HBA1C MFR BLD: 5.5 % (ref 4.2–5.6)
HDLC SERPL-MCNC: 26 MG/DL (ref 40–60)
HDLC SERPL: 8.2 (ref 0–5)
LDLC SERPL CALC-MCNC: 107.4 MG/DL (ref 0–100)
LIPID PROFILE,FLP: ABNORMAL
POTASSIUM SERPL-SCNC: 4.3 MMOL/L (ref 3.5–5.5)
PROT SERPL-MCNC: 7 G/DL (ref 6.4–8.2)
SODIUM SERPL-SCNC: 138 MMOL/L (ref 136–145)
TRIGL SERPL-MCNC: 393 MG/DL (ref ?–150)
VLDLC SERPL CALC-MCNC: 78.6 MG/DL

## 2023-01-24 PROCEDURE — 36415 COLL VENOUS BLD VENIPUNCTURE: CPT

## 2023-01-24 PROCEDURE — 83036 HEMOGLOBIN GLYCOSYLATED A1C: CPT

## 2023-01-24 PROCEDURE — 80061 LIPID PANEL: CPT

## 2023-01-24 PROCEDURE — 80053 COMPREHEN METABOLIC PANEL: CPT

## 2023-04-17 ENCOUNTER — HOSPITAL ENCOUNTER (OUTPATIENT)
Facility: HOSPITAL | Age: 43
Discharge: HOME | End: 2023-04-20
Payer: COMMERCIAL

## 2023-04-17 DIAGNOSIS — Z12.31 ENCOUNTER FOR SCREENING MAMMOGRAM FOR MALIGNANT NEOPLASM OF BREAST: ICD-10-CM

## 2023-04-17 PROCEDURE — 77063 BREAST TOMOSYNTHESIS BI: CPT

## 2023-10-30 ENCOUNTER — TELEPHONE (OUTPATIENT)
Facility: CLINIC | Age: 43
End: 2023-10-30

## 2023-10-30 DIAGNOSIS — B00.9 HSV (HERPES SIMPLEX VIRUS) INFECTION: Primary | ICD-10-CM

## 2023-10-31 RX ORDER — ACYCLOVIR 400 MG/1
400 TABLET ORAL 3 TIMES DAILY
Qty: 30 TABLET | Refills: 0 | Status: SHIPPED | OUTPATIENT
Start: 2023-10-31 | End: 2023-11-10

## 2023-12-08 DIAGNOSIS — B00.9 HSV (HERPES SIMPLEX VIRUS) INFECTION: ICD-10-CM

## 2023-12-11 RX ORDER — ACYCLOVIR 400 MG/1
TABLET ORAL
Qty: 30 TABLET | Refills: 0 | Status: SHIPPED | OUTPATIENT
Start: 2023-12-11

## 2024-09-17 DIAGNOSIS — B00.9 HSV (HERPES SIMPLEX VIRUS) INFECTION: ICD-10-CM

## 2024-09-18 RX ORDER — ACYCLOVIR 400 MG/1
TABLET ORAL
Qty: 30 TABLET | Refills: 0 | OUTPATIENT
Start: 2024-09-18